# Patient Record
Sex: FEMALE | Race: WHITE | Employment: OTHER
[De-identification: names, ages, dates, MRNs, and addresses within clinical notes are randomized per-mention and may not be internally consistent; named-entity substitution may affect disease eponyms.]

---

## 2017-01-05 DIAGNOSIS — Z12.31 SCREENING MAMMOGRAM, ENCOUNTER FOR: ICD-10-CM

## 2017-02-21 ENCOUNTER — OFFICE VISIT (OUTPATIENT)
Dept: FAMILY MEDICINE CLINIC | Facility: CLINIC | Age: 67
End: 2017-02-21

## 2017-02-21 VITALS
DIASTOLIC BLOOD PRESSURE: 74 MMHG | SYSTOLIC BLOOD PRESSURE: 128 MMHG | WEIGHT: 128 LBS | HEART RATE: 71 BPM | TEMPERATURE: 98.2 F | OXYGEN SATURATION: 98 % | BODY MASS INDEX: 20.66 KG/M2

## 2017-02-21 DIAGNOSIS — I10 ESSENTIAL HYPERTENSION: Primary | ICD-10-CM

## 2017-02-21 DIAGNOSIS — Z23 NEED FOR PNEUMOCOCCAL VACCINATION: ICD-10-CM

## 2017-02-21 DIAGNOSIS — E78.00 HIGH CHOLESTEROL: ICD-10-CM

## 2017-02-21 PROCEDURE — G0009 ADMIN PNEUMOCOCCAL VACCINE: HCPCS | Performed by: FAMILY MEDICINE

## 2017-02-21 PROCEDURE — 99213 OFFICE O/P EST LOW 20 MIN: CPT | Performed by: FAMILY MEDICINE

## 2017-02-21 PROCEDURE — 90732 PPSV23 VACC 2 YRS+ SUBQ/IM: CPT | Performed by: FAMILY MEDICINE

## 2017-02-21 ASSESSMENT — ENCOUNTER SYMPTOMS
BLOOD IN STOOL: 0
SHORTNESS OF BREATH: 0
ABDOMINAL PAIN: 0

## 2017-02-22 ASSESSMENT — ENCOUNTER SYMPTOMS
TROUBLE SWALLOWING: 0
DIARRHEA: 0
COUGH: 0
WHEEZING: 0
VOMITING: 0
SORE THROAT: 0

## 2017-03-24 RX ORDER — NIFEDIPINE 60 MG/1
TABLET, EXTENDED RELEASE ORAL
Qty: 90 TABLET | Refills: 1 | Status: SHIPPED | OUTPATIENT
Start: 2017-03-24 | End: 2017-08-29 | Stop reason: SDUPTHER

## 2017-04-17 RX ORDER — POTASSIUM CHLORIDE 750 MG/1
TABLET, EXTENDED RELEASE ORAL
Qty: 180 TABLET | Refills: 1 | Status: SHIPPED | OUTPATIENT
Start: 2017-04-17 | End: 2017-09-26 | Stop reason: SDUPTHER

## 2017-04-17 RX ORDER — ETODOLAC 400 MG/1
TABLET, FILM COATED ORAL
Qty: 180 TABLET | Refills: 1 | Status: SHIPPED | OUTPATIENT
Start: 2017-04-17 | End: 2017-05-02

## 2017-05-01 RX ORDER — FAMOTIDINE 20 MG/1
TABLET, FILM COATED ORAL
Qty: 180 TABLET | Refills: 1 | Status: SHIPPED | OUTPATIENT
Start: 2017-05-01 | End: 2017-08-21 | Stop reason: SDUPTHER

## 2017-05-01 RX ORDER — MIRTAZAPINE 15 MG/1
TABLET, FILM COATED ORAL
Qty: 90 TABLET | Refills: 1 | Status: SHIPPED | OUTPATIENT
Start: 2017-05-01 | End: 2017-12-01 | Stop reason: SDUPTHER

## 2017-05-02 RX ORDER — ETODOLAC 400 MG/1
TABLET, FILM COATED ORAL
Qty: 180 TABLET | Refills: 1 | Status: SHIPPED | OUTPATIENT
Start: 2017-05-02 | End: 2018-01-23 | Stop reason: SDUPTHER

## 2017-06-23 ENCOUNTER — OFFICE VISIT (OUTPATIENT)
Dept: FAMILY MEDICINE CLINIC | Age: 67
End: 2017-06-23
Payer: COMMERCIAL

## 2017-06-23 VITALS
TEMPERATURE: 97.6 F | HEART RATE: 69 BPM | DIASTOLIC BLOOD PRESSURE: 77 MMHG | SYSTOLIC BLOOD PRESSURE: 154 MMHG | WEIGHT: 128.4 LBS | BODY MASS INDEX: 20.72 KG/M2 | OXYGEN SATURATION: 95 %

## 2017-06-23 DIAGNOSIS — I10 ESSENTIAL HYPERTENSION: Primary | ICD-10-CM

## 2017-06-23 DIAGNOSIS — J01.00 ACUTE MAXILLARY SINUSITIS, RECURRENCE NOT SPECIFIED: ICD-10-CM

## 2017-06-23 DIAGNOSIS — E78.00 HYPERCHOLESTEREMIA: ICD-10-CM

## 2017-06-23 PROCEDURE — 99214 OFFICE O/P EST MOD 30 MIN: CPT | Performed by: INTERNAL MEDICINE

## 2017-06-23 RX ORDER — FLUTICASONE PROPIONATE 50 MCG
1 SPRAY, SUSPENSION (ML) NASAL DAILY
Qty: 1 BOTTLE | Refills: 1 | Status: SHIPPED | OUTPATIENT
Start: 2017-06-23 | End: 2017-09-26 | Stop reason: ALTCHOICE

## 2017-06-23 RX ORDER — AMOXICILLIN AND CLAVULANATE POTASSIUM 875; 125 MG/1; MG/1
1 TABLET, FILM COATED ORAL 2 TIMES DAILY
Qty: 14 TABLET | Refills: 0 | Status: SHIPPED | OUTPATIENT
Start: 2017-06-23 | End: 2017-06-30

## 2017-06-23 ASSESSMENT — PATIENT HEALTH QUESTIONNAIRE - PHQ9
SUM OF ALL RESPONSES TO PHQ QUESTIONS 1-9: 0
2. FEELING DOWN, DEPRESSED OR HOPELESS: 0
1. LITTLE INTEREST OR PLEASURE IN DOING THINGS: 0
SUM OF ALL RESPONSES TO PHQ9 QUESTIONS 1 & 2: 0

## 2017-07-07 ENCOUNTER — NURSE ONLY (OUTPATIENT)
Dept: FAMILY MEDICINE CLINIC | Age: 67
End: 2017-07-07
Payer: COMMERCIAL

## 2017-07-07 VITALS — DIASTOLIC BLOOD PRESSURE: 68 MMHG | SYSTOLIC BLOOD PRESSURE: 122 MMHG

## 2017-07-07 DIAGNOSIS — I10 ESSENTIAL HYPERTENSION: Primary | ICD-10-CM

## 2017-07-07 PROCEDURE — 99212 OFFICE O/P EST SF 10 MIN: CPT | Performed by: INTERNAL MEDICINE

## 2017-08-22 RX ORDER — FAMOTIDINE 20 MG/1
TABLET, FILM COATED ORAL
Qty: 180 TABLET | Refills: 1 | Status: SHIPPED | OUTPATIENT
Start: 2017-08-22 | End: 2018-05-15 | Stop reason: SDUPTHER

## 2017-08-29 RX ORDER — NIFEDIPINE 60 MG/1
TABLET, EXTENDED RELEASE ORAL
Qty: 90 TABLET | Refills: 1 | Status: SHIPPED | OUTPATIENT
Start: 2017-08-29 | End: 2018-03-17 | Stop reason: SDUPTHER

## 2017-09-26 ENCOUNTER — OFFICE VISIT (OUTPATIENT)
Dept: FAMILY MEDICINE CLINIC | Age: 67
End: 2017-09-26
Payer: COMMERCIAL

## 2017-09-26 VITALS
SYSTOLIC BLOOD PRESSURE: 124 MMHG | DIASTOLIC BLOOD PRESSURE: 65 MMHG | HEART RATE: 70 BPM | TEMPERATURE: 97.5 F | OXYGEN SATURATION: 98 % | WEIGHT: 130.2 LBS | HEIGHT: 66 IN | BODY MASS INDEX: 20.93 KG/M2

## 2017-09-26 DIAGNOSIS — I10 ESSENTIAL HYPERTENSION: Primary | ICD-10-CM

## 2017-09-26 DIAGNOSIS — K21.9 GASTROESOPHAGEAL REFLUX DISEASE, ESOPHAGITIS PRESENCE NOT SPECIFIED: ICD-10-CM

## 2017-09-26 DIAGNOSIS — E78.00 HYPERCHOLESTEREMIA: ICD-10-CM

## 2017-09-26 DIAGNOSIS — Z23 NEED FOR INFLUENZA VACCINATION: ICD-10-CM

## 2017-09-26 DIAGNOSIS — Z00.00 HEALTH CARE MAINTENANCE: ICD-10-CM

## 2017-09-26 DIAGNOSIS — Z12.31 ENCOUNTER FOR SCREENING MAMMOGRAM FOR BREAST CANCER: ICD-10-CM

## 2017-09-26 PROCEDURE — 90662 IIV NO PRSV INCREASED AG IM: CPT | Performed by: INTERNAL MEDICINE

## 2017-09-26 PROCEDURE — 99213 OFFICE O/P EST LOW 20 MIN: CPT | Performed by: INTERNAL MEDICINE

## 2017-09-26 PROCEDURE — G0008 ADMIN INFLUENZA VIRUS VAC: HCPCS | Performed by: INTERNAL MEDICINE

## 2017-09-26 RX ORDER — POTASSIUM CHLORIDE 750 MG/1
10 TABLET, EXTENDED RELEASE ORAL 2 TIMES DAILY
Qty: 180 TABLET | Refills: 1 | Status: SHIPPED | OUTPATIENT
Start: 2017-09-26 | End: 2017-09-27 | Stop reason: SDUPTHER

## 2017-09-27 RX ORDER — POTASSIUM CHLORIDE 750 MG/1
10 TABLET, EXTENDED RELEASE ORAL 2 TIMES DAILY
Qty: 180 TABLET | Refills: 1 | Status: SHIPPED | OUTPATIENT
Start: 2017-09-27 | End: 2018-04-07 | Stop reason: SDUPTHER

## 2017-11-14 DIAGNOSIS — Z12.31 ENCOUNTER FOR SCREENING MAMMOGRAM FOR BREAST CANCER: ICD-10-CM

## 2017-11-21 RX ORDER — SIMVASTATIN 40 MG
TABLET ORAL
Qty: 90 TABLET | Refills: 1 | Status: SHIPPED | OUTPATIENT
Start: 2017-11-21 | End: 2018-05-15 | Stop reason: SDUPTHER

## 2017-11-21 RX ORDER — SIMVASTATIN 40 MG
TABLET ORAL
Qty: 90 TABLET | Refills: 1 | Status: SHIPPED | OUTPATIENT
Start: 2017-11-21 | End: 2017-11-21 | Stop reason: SDUPTHER

## 2017-11-21 NOTE — TELEPHONE ENCOUNTER
Last visit 9/26/17  Next Visit Date:  Future Appointments  Date Time Provider Simone Arangoi   12/28/2017 10:00 AM Iliana Pierson  [address] [facility]   Topic Date Due    DTaP/Tdap/Td vaccine (1 - Tdap) 07/21/1968    Breast cancer screen  11/09/2019    Lipid screen  11/18/2021    Colon cancer screen colonoscopy  11/06/2027    Zostavax vaccine  Completed    DEXA (modify frequency per FRAX score)  Completed    Flu vaccine  Completed    Pneumococcal low/med risk  Completed    Hepatitis C screen  Completed       No results found for: LABA1C          ( goal A1C is < 7)   No results found for: LABMICR  LDL Calculated (mg/dL)   Date Value   11/18/2016 84       (goal LDL is <100)   No results found for: AST, ALT, BUN  BP Readings from Last 3 Encounters:   09/26/17 124/65   07/07/17 122/68   06/23/17 (!) 154/77          (goal 120/80)    All Future Testing planned in [facility]  Lab Frequency Next Occurrence   Lipid Panel Once 11/26/2017   Comprehensive Metabolic Panel Once 86/52/2258   CBC With Auto Differential Once 11/26/2017   Urinalysis with Microscopic Once 11/26/2017               Patient Active Problem List:     Essential hypertension     Hypercholesteremia     GERD (gastroesophageal reflux disease)     DJD (degenerative joint disease)     Arthritis

## 2017-12-01 NOTE — TELEPHONE ENCOUNTER
Last visit 9/26/17  Next Visit Date:  Future Appointments  Date Time Provider Simone Arangoi   12/28/2017 10:00 AM Iliana Arechiga  [address] [facility]   Topic Date Due    DTaP/Tdap/Td vaccine (1 - Tdap) 07/21/1968    Breast cancer screen  11/09/2019    Lipid screen  11/18/2021    Colon cancer screen colonoscopy  11/06/2027    Zostavax vaccine  Completed    DEXA (modify frequency per FRAX score)  Completed    Flu vaccine  Completed    Pneumococcal low/med risk  Completed    Hepatitis C screen  Completed       No results found for: LABA1C          ( goal A1C is < 7)   No results found for: LABMICR  LDL Calculated (mg/dL)   Date Value   11/18/2016 84       (goal LDL is <100)   No results found for: AST, ALT, BUN  BP Readings from Last 3 Encounters:   09/26/17 124/65   07/07/17 122/68   06/23/17 (!) 154/77          (goal 120/80)    All Future Testing planned in [facility]  Lab Frequency Next Occurrence   Lipid Panel Once 11/26/2017   Comprehensive Metabolic Panel Once 11/11/2418   CBC With Auto Differential Once 11/26/2017   Urinalysis with Microscopic Once 11/26/2017               Patient Active Problem List:     Essential hypertension     Hypercholesteremia     GERD (gastroesophageal reflux disease)     DJD (degenerative joint disease)     Arthritis

## 2017-12-05 RX ORDER — MIRTAZAPINE 15 MG/1
TABLET, FILM COATED ORAL
Qty: 90 TABLET | Refills: 1 | Status: SHIPPED | OUTPATIENT
Start: 2017-12-05 | End: 2018-06-11 | Stop reason: SDUPTHER

## 2017-12-13 LAB
ALBUMIN SERPL-MCNC: 4.3 G/DL
ALP BLD-CCNC: 55 U/L
ALT SERPL-CCNC: 20 U/L
ANION GAP SERPL CALCULATED.3IONS-SCNC: 9 MMOL/L
AST SERPL-CCNC: 25 U/L
BASOPHILS ABSOLUTE: 0.1 /ΜL
BASOPHILS RELATIVE PERCENT: 0.9 %
BILIRUB SERPL-MCNC: 0.5 MG/DL (ref 0.1–1.4)
BUN BLDV-MCNC: 10 MG/DL
CALCIUM SERPL-MCNC: 9.5 MG/DL
CHLORIDE BLD-SCNC: 101 MMOL/L
CHOLESTEROL, TOTAL: 174 MG/DL
CHOLESTEROL/HDL RATIO: 3
CO2: 31 MMOL/L
CREAT SERPL-MCNC: 0.62 MG/DL
EOSINOPHILS ABSOLUTE: 0.4 /ΜL
EOSINOPHILS RELATIVE PERCENT: 6.8 %
GFR CALCULATED: >60
GLUCOSE BLD-MCNC: 103 MG/DL
HCT VFR BLD CALC: 41.4 % (ref 36–46)
HDLC SERPL-MCNC: 58 MG/DL (ref 35–70)
HEMOGLOBIN: 14.3 G/DL (ref 12–16)
LDL CHOLESTEROL CALCULATED: 89 MG/DL (ref 0–160)
LYMPHOCYTES ABSOLUTE: 1.7 /ΜL
LYMPHOCYTES RELATIVE PERCENT: 29.2 %
MCH RBC QN AUTO: 30.3 PG
MCHC RBC AUTO-ENTMCNC: 34.5 G/DL
MCV RBC AUTO: 88 FL
MONOCYTES ABSOLUTE: 0.5 /ΜL
MONOCYTES RELATIVE PERCENT: 7.9 %
NEUTROPHILS ABSOLUTE: 3.2 /ΜL
NEUTROPHILS RELATIVE PERCENT: 55.2 %
PDW BLD-RTO: 13.1 %
PLATELET # BLD: 274 K/ΜL
PMV BLD AUTO: 8.5 FL
POTASSIUM SERPL-SCNC: 4 MMOL/L
RBC # BLD: 4.71 10^6/ΜL
SODIUM BLD-SCNC: 141 MMOL/L
TOTAL PROTEIN: 7.3
TRIGL SERPL-MCNC: 135 MG/DL
VLDLC SERPL CALC-MCNC: 27 MG/DL
WBC # BLD: 5.8 10^3/ML

## 2018-01-03 ENCOUNTER — OFFICE VISIT (OUTPATIENT)
Dept: FAMILY MEDICINE CLINIC | Age: 68
End: 2018-01-03
Payer: COMMERCIAL

## 2018-01-03 VITALS
SYSTOLIC BLOOD PRESSURE: 130 MMHG | BODY MASS INDEX: 21.15 KG/M2 | TEMPERATURE: 97.4 F | RESPIRATION RATE: 16 BRPM | HEIGHT: 66 IN | OXYGEN SATURATION: 96 % | WEIGHT: 131.6 LBS | HEART RATE: 69 BPM | DIASTOLIC BLOOD PRESSURE: 80 MMHG

## 2018-01-03 DIAGNOSIS — I10 ESSENTIAL HYPERTENSION: Primary | ICD-10-CM

## 2018-01-03 DIAGNOSIS — K21.9 GASTROESOPHAGEAL REFLUX DISEASE, ESOPHAGITIS PRESENCE NOT SPECIFIED: ICD-10-CM

## 2018-01-03 DIAGNOSIS — G89.29 CHRONIC BILATERAL LOW BACK PAIN WITHOUT SCIATICA: ICD-10-CM

## 2018-01-03 DIAGNOSIS — E78.00 HYPERCHOLESTEREMIA: ICD-10-CM

## 2018-01-03 DIAGNOSIS — M54.50 CHRONIC BILATERAL LOW BACK PAIN WITHOUT SCIATICA: ICD-10-CM

## 2018-01-03 PROCEDURE — 99214 OFFICE O/P EST MOD 30 MIN: CPT | Performed by: INTERNAL MEDICINE

## 2018-01-03 NOTE — PROGRESS NOTES
Subjective:       Patient ID: Ursula Harris is a 75 y.o. female who presents for   Chief Complaint   Patient presents with   [place]   , and follow up of chronic medical problems. HPI:  Dyslipidemia  Patient presents for evaluation of lipids. Compliance with treatment thus far has been good. A repeat fasting lipid profile was not done. The patient does use medications that may worsen dyslipidemias (corticosteroids, progestins, anabolic steroids, diuretics, beta-blockers, amiodarone, cyclosporine, olanzapine). The patient exercises infrequently. The patient is not known to have coexisting coronary artery disease. Hypertension  Patient is here for follow-up of elevated blood pressure. She is not exercising and is adherent to a low-salt diet. Blood pressure is well controlled at home. Cardiac symptoms: none. Patient denies chest pain, chest pressure/discomfort, claudication, exertional chest pressure/discomfort, fatigue, irregular heart beat, near-syncope, orthopnea, palpitations and paroxysmal nocturnal dyspnea. Cardiovascular risk factors: dyslipidemia, hypertension and sedentary lifestyle. Use of agents associated with hypertension: none. History of target organ damage: none. L ear discomfort resolved     Patient's medications, allergies, past medical, surgical, social and family histories were reviewed and updated as appropriate. Social History   Substance Use Topics    Smoking status: Former Smoker     Packs/day: 1.50     Years: 15.00     Quit date: 9/15/1997    Smokeless tobacco: Never Used    Alcohol use 0.0 oz/week      Comment: social        Review of Systems  Energy level good overall, and weight is stable. No chest pain or shortness of breath.   Bowels have been normal without constipation or diarrhea       Objective:          /80 (Site: Right Arm, Position: Sitting, Cuff Size: Small Adult)   Pulse 69   Temp 97.4 °F (36.3 °C) (Oral)   Resp 16   Ht 5' 6.3\" (1.684 m)   Wt 131

## 2018-01-03 NOTE — PATIENT INSTRUCTIONS
[email or web address] and sign in to your [facility] account. Enter R646 in the [employer] box to learn more about \"Back Stretches: Exercises. \"     If you do not have an account, please click on the \"Sign Up Now\" link. Current as of: March 21, 2017  Content Version: 11.4  © 3316-4981 Healthwise, [employer]. Care instructions adapted under license by [facility] ([facility]). If you have questions about a medical condition or this instruction, always ask your healthcare professional. [address] any warranty or liability for your use of this information.

## 2018-01-08 RX ORDER — HYDROCHLOROTHIAZIDE 25 MG/1
TABLET ORAL
Qty: 90 TABLET | Refills: 1 | Status: SHIPPED | OUTPATIENT
Start: 2018-01-08 | End: 2018-06-30 | Stop reason: SDUPTHER

## 2018-01-12 DIAGNOSIS — Z00.00 HEALTH CARE MAINTENANCE: ICD-10-CM

## 2018-01-12 DIAGNOSIS — I10 ESSENTIAL HYPERTENSION: ICD-10-CM

## 2018-01-12 DIAGNOSIS — E78.00 HYPERCHOLESTEREMIA: ICD-10-CM

## 2018-01-23 RX ORDER — ETODOLAC 400 MG/1
TABLET, FILM COATED ORAL
Qty: 180 TABLET | Refills: 1 | Status: SHIPPED | OUTPATIENT
Start: 2018-01-23 | End: 2018-05-15 | Stop reason: ALTCHOICE

## 2018-03-19 NOTE — TELEPHONE ENCOUNTER
Last refill 12/03/2017  Last visit 01/03/2018    Next Visit Date:  Future Appointments  Date Time Provider Simone Arangoi   5/15/2018 9:00 AM Iliana Kee  [address] [facility]   Topic Date Due    Shingles Vaccine (1 of 2 - 2 Dose Series) 07/21/1999    DTaP/Tdap/Td vaccine (1 - Tdap) 01/03/2019 (Originally 7/21/1969)    Potassium monitoring  12/13/2018    Creatinine monitoring  12/13/2018    Breast cancer screen  11/09/2019    Lipid screen  12/13/2022    Colon cancer screen colonoscopy  11/06/2027    DEXA (modify frequency per FRAX score)  Completed    Flu vaccine  Completed    Pneumococcal low/med risk  Completed    Hepatitis C screen  Completed       No results found for: LABA1C          ( goal A1C is < 7)   No results found for: LABMICR  LDL Calculated (mg/dL)   Date Value   12/13/2017 89       (goal LDL is <100)   AST (U/L)   Date Value   12/13/2017 25     ALT (U/L)   Date Value   12/13/2017 20     BUN (mg/dL)   Date Value   12/13/2017 10     BP Readings from Last 3 Encounters:   01/03/18 130/80   09/26/17 124/65   07/07/17 122/68          (goal 120/80)    All Future Testing planned in [facility]              Patient Active Problem List:     Essential hypertension     Hypercholesteremia     GERD (gastroesophageal reflux disease)     DJD (degenerative joint disease)     Arthritis

## 2018-03-20 RX ORDER — NIFEDIPINE 60 MG/1
TABLET, EXTENDED RELEASE ORAL
Qty: 90 TABLET | Refills: 1 | Status: SHIPPED | OUTPATIENT
Start: 2018-03-20 | End: 2018-08-15 | Stop reason: SDUPTHER

## 2018-04-09 RX ORDER — POTASSIUM CHLORIDE 750 MG/1
TABLET, EXTENDED RELEASE ORAL
Qty: 180 TABLET | Refills: 1 | Status: SHIPPED | OUTPATIENT
Start: 2018-04-09 | End: 2018-10-01

## 2018-05-15 ENCOUNTER — OFFICE VISIT (OUTPATIENT)
Dept: FAMILY MEDICINE CLINIC | Age: 68
End: 2018-05-15
Payer: COMMERCIAL

## 2018-05-15 VITALS
TEMPERATURE: 97.1 F | SYSTOLIC BLOOD PRESSURE: 136 MMHG | BODY MASS INDEX: 20.95 KG/M2 | DIASTOLIC BLOOD PRESSURE: 70 MMHG | WEIGHT: 131 LBS | HEART RATE: 71 BPM | RESPIRATION RATE: 16 BRPM | OXYGEN SATURATION: 98 %

## 2018-05-15 DIAGNOSIS — B96.89 ACUTE BACTERIAL SINUSITIS: ICD-10-CM

## 2018-05-15 DIAGNOSIS — J01.90 ACUTE BACTERIAL SINUSITIS: ICD-10-CM

## 2018-05-15 DIAGNOSIS — M15.9 PRIMARY OSTEOARTHRITIS INVOLVING MULTIPLE JOINTS: ICD-10-CM

## 2018-05-15 DIAGNOSIS — I10 ESSENTIAL HYPERTENSION: Primary | ICD-10-CM

## 2018-05-15 DIAGNOSIS — E78.00 HYPERCHOLESTEREMIA: ICD-10-CM

## 2018-05-15 DIAGNOSIS — K21.9 GASTROESOPHAGEAL REFLUX DISEASE, ESOPHAGITIS PRESENCE NOT SPECIFIED: ICD-10-CM

## 2018-05-15 PROCEDURE — 99214 OFFICE O/P EST MOD 30 MIN: CPT | Performed by: INTERNAL MEDICINE

## 2018-05-15 RX ORDER — SIMVASTATIN 40 MG
TABLET ORAL
Qty: 90 TABLET | Refills: 1 | Status: SHIPPED | OUTPATIENT
Start: 2018-05-15 | End: 2018-10-05 | Stop reason: SDUPTHER

## 2018-05-15 RX ORDER — FAMOTIDINE 20 MG/1
TABLET, FILM COATED ORAL
Qty: 180 TABLET | Refills: 1 | Status: SHIPPED | OUTPATIENT
Start: 2018-05-15 | End: 2018-10-05 | Stop reason: SDUPTHER

## 2018-05-15 RX ORDER — DICLOFENAC SODIUM 75 MG/1
75 TABLET, DELAYED RELEASE ORAL 2 TIMES DAILY
Qty: 180 TABLET | Refills: 1 | Status: SHIPPED | OUTPATIENT
Start: 2018-05-15 | End: 2018-10-05 | Stop reason: SDUPTHER

## 2018-05-15 RX ORDER — AMOXICILLIN AND CLAVULANATE POTASSIUM 875; 125 MG/1; MG/1
1 TABLET, FILM COATED ORAL 2 TIMES DAILY
Qty: 14 TABLET | Refills: 0 | Status: SHIPPED | OUTPATIENT
Start: 2018-05-15 | End: 2018-05-22

## 2018-06-12 RX ORDER — MIRTAZAPINE 15 MG/1
TABLET, FILM COATED ORAL
Qty: 90 TABLET | Refills: 1 | Status: SHIPPED | OUTPATIENT
Start: 2018-06-12 | End: 2018-10-05 | Stop reason: SDUPTHER

## 2018-07-02 RX ORDER — HYDROCHLOROTHIAZIDE 25 MG/1
TABLET ORAL
Qty: 90 TABLET | Refills: 1 | Status: SHIPPED | OUTPATIENT
Start: 2018-07-02 | End: 2018-10-05 | Stop reason: SDUPTHER

## 2018-07-02 NOTE — TELEPHONE ENCOUNTER
Filled 1/8/18 #90 with 1 RF  Last seen 5/15/18    Next Visit Date:  Future Appointments  Date Time Provider Simone Arangoi   8/15/2018 9:00 AM Iliana Gardner  [address] [facility]   Topic Date Due    DTaP/Tdap/Td vaccine (1 - Tdap) 01/03/2019 (Originally 7/21/1969)    Shingles Vaccine (1 of 2 - 2 Dose Series) 05/15/2019 (Originally 11/1/2010)    Flu vaccine (1) 09/01/2018    Potassium monitoring  12/13/2018    Creatinine monitoring  12/13/2018    Breast cancer screen  11/09/2019    Lipid screen  12/13/2022    Colon cancer screen colonoscopy  11/06/2027    DEXA (modify frequency per FRAX score)  Completed    Pneumococcal low/med risk  Completed    Hepatitis C screen  Completed       No results found for: LABA1C          ( goal A1C is < 7)   No results found for: LABMICR  LDL Calculated (mg/dL)   Date Value   12/13/2017 89       (goal LDL is <100)   AST (U/L)   Date Value   12/13/2017 25     ALT (U/L)   Date Value   12/13/2017 20     BUN (mg/dL)   Date Value   12/13/2017 10     BP Readings from Last 3 Encounters:   05/15/18 136/70   01/03/18 130/80   09/26/17 124/65          (goal 120/80)    All Future Testing planned in [facility]              Patient Active Problem List:     Essential hypertension     Hypercholesteremia     GERD (gastroesophageal reflux disease)     DJD (degenerative joint disease)     Arthritis

## 2018-08-15 ENCOUNTER — OFFICE VISIT (OUTPATIENT)
Dept: FAMILY MEDICINE CLINIC | Age: 68
End: 2018-08-15
Payer: COMMERCIAL

## 2018-08-15 VITALS
DIASTOLIC BLOOD PRESSURE: 64 MMHG | OXYGEN SATURATION: 97 % | HEART RATE: 71 BPM | WEIGHT: 129.6 LBS | TEMPERATURE: 98 F | BODY MASS INDEX: 20.73 KG/M2 | SYSTOLIC BLOOD PRESSURE: 120 MMHG | RESPIRATION RATE: 16 BRPM

## 2018-08-15 DIAGNOSIS — K21.9 GASTROESOPHAGEAL REFLUX DISEASE, ESOPHAGITIS PRESENCE NOT SPECIFIED: ICD-10-CM

## 2018-08-15 DIAGNOSIS — I10 ESSENTIAL HYPERTENSION: Primary | ICD-10-CM

## 2018-08-15 DIAGNOSIS — M15.9 PRIMARY OSTEOARTHRITIS INVOLVING MULTIPLE JOINTS: ICD-10-CM

## 2018-08-15 DIAGNOSIS — E78.00 HYPERCHOLESTEREMIA: ICD-10-CM

## 2018-08-15 PROCEDURE — 99214 OFFICE O/P EST MOD 30 MIN: CPT | Performed by: INTERNAL MEDICINE

## 2018-08-15 RX ORDER — NIFEDIPINE 60 MG/1
TABLET, EXTENDED RELEASE ORAL
Qty: 90 TABLET | Refills: 1 | Status: SHIPPED | OUTPATIENT
Start: 2018-08-15 | End: 2018-10-05 | Stop reason: SDUPTHER

## 2018-08-15 ASSESSMENT — PATIENT HEALTH QUESTIONNAIRE - PHQ9
SUM OF ALL RESPONSES TO PHQ9 QUESTIONS 1 & 2: 0
1. LITTLE INTEREST OR PLEASURE IN DOING THINGS: 0
SUM OF ALL RESPONSES TO PHQ QUESTIONS 1-9: 0
SUM OF ALL RESPONSES TO PHQ QUESTIONS 1-9: 0
2. FEELING DOWN, DEPRESSED OR HOPELESS: 0

## 2018-08-15 NOTE — PROGRESS NOTES
Patient is present for HTN check up. Medications and pahrmacy reviewed. No concerns at this time. Visit Information    Have you changed or started any medications since your last visit including any over-the-counter medicines, vitamins, or herbal medicines? no   Have you stopped taking any of your medications? Is so, why? -  no  Are you having any side effects from any of your medications? - no    Have you seen any other physician or provider since your last visit?  no   Have you had any other diagnostic tests since your last visit?  no   Have you been seen in the emergency room and/or had an admission in a hospital since we last saw you?  no   Have you had your routine dental cleaning in the past 6 months?  no     Do you have an active MyChart account? If no, what is the barrier?   Yes    Patient Care Team:  Kathy Zimmer MD as PCP - General (Internal Medicine)    Medical History Review  Past Medical, Family, and Social History reviewed and does not contribute to the patient presenting condition    Health Maintenance   Topic Date Due    DTaP/Tdap/Td vaccine (1 - Tdap) 01/03/2019 (Originally 7/21/1969)    Shingles Vaccine (1 of 2 - 2 Dose Series) 05/15/2019 (Originally 11/1/2010)    Flu vaccine (1) 09/01/2018    Potassium monitoring  12/13/2018    Creatinine monitoring  12/13/2018    Breast cancer screen  11/09/2019    Lipid screen  12/13/2022    Colon cancer screen colonoscopy  11/06/2027    DEXA (modify frequency per FRAX score)  Completed    Pneumococcal low/med risk  Completed    Hepatitis C screen  Completed

## 2018-08-15 NOTE — PROGRESS NOTES
Subjective:       Patient ID: Zahraa Vee is a 70 y.o. female who presents for   Chief Complaint   Patient presents with    Hypertension   , and follow up of chronic medical problems. HPI:  Dyslipidemia  Patient presents for evaluation of lipids. Compliance with treatment thus far has been good. A repeat fasting lipid profile was not done. The patient does use medications that may worsen dyslipidemias (corticosteroids, progestins, anabolic steroids, diuretics, beta-blockers, amiodarone, cyclosporine, olanzapine). The patient exercises infrequently. The patient is not known to have coexisting coronary artery disease. Hypertension  Patient is here for follow-up of elevated blood pressure. She is not exercising and is adherent to a low-salt diet. Blood pressure is well controlled at home. Cardiac symptoms: none. Patient denies chest pain, chest pressure/discomfort, claudication, exertional chest pressure/discomfort, fatigue, irregular heart beat, near-syncope, orthopnea, palpitations and paroxysmal nocturnal dyspnea. Cardiovascular risk factors: dyslipidemia, hypertension and sedentary lifestyle. Use of agents associated with hypertension: none. History of target organ damage: none. Heartburn - well-controlled on pepcid BID. Back pain is getting worse, feels like it is going down into the lower parts of her back and into the hips. No numbness/tingling in legs, leg weakness. --> much better controlled on diclofenac last visit, not needing any other OTC meds to help her pain, is able to do her gardening now without too much trouble. Patient's medications, allergies, past medical, surgical, social and family histories were reviewed and updated as appropriate.     Social History   Substance Use Topics    Smoking status: Former Smoker     Packs/day: 1.50     Years: 15.00     Quit date: 9/15/1997    Smokeless tobacco: Never Used    Alcohol use 0.0 oz/week      Comment: social        Review of

## 2018-10-01 RX ORDER — POTASSIUM CHLORIDE 750 MG/1
TABLET, EXTENDED RELEASE ORAL
Qty: 180 TABLET | Refills: 1 | Status: SHIPPED | OUTPATIENT
Start: 2018-10-01 | End: 2018-10-05 | Stop reason: SDUPTHER

## 2018-10-05 DIAGNOSIS — K21.9 GASTROESOPHAGEAL REFLUX DISEASE, ESOPHAGITIS PRESENCE NOT SPECIFIED: ICD-10-CM

## 2018-10-05 DIAGNOSIS — I10 ESSENTIAL HYPERTENSION: ICD-10-CM

## 2018-10-05 DIAGNOSIS — M15.9 PRIMARY OSTEOARTHRITIS INVOLVING MULTIPLE JOINTS: ICD-10-CM

## 2018-10-05 DIAGNOSIS — E78.00 HYPERCHOLESTEREMIA: ICD-10-CM

## 2018-10-05 RX ORDER — DICLOFENAC SODIUM 75 MG/1
75 TABLET, DELAYED RELEASE ORAL 2 TIMES DAILY
Qty: 180 TABLET | Refills: 1 | Status: SHIPPED | OUTPATIENT
Start: 2018-10-05 | End: 2019-01-30 | Stop reason: SDUPTHER

## 2018-10-05 RX ORDER — POTASSIUM CHLORIDE 750 MG/1
TABLET, EXTENDED RELEASE ORAL
Qty: 28 TABLET | Refills: 0 | Status: SHIPPED | OUTPATIENT
Start: 2018-10-05 | End: 2019-06-19 | Stop reason: ALTCHOICE

## 2018-10-05 RX ORDER — HYDROCHLOROTHIAZIDE 25 MG/1
TABLET ORAL
Qty: 90 TABLET | Refills: 1 | Status: SHIPPED | OUTPATIENT
Start: 2018-10-05 | End: 2019-02-26 | Stop reason: SDUPTHER

## 2018-10-05 RX ORDER — NIFEDIPINE 60 MG/1
TABLET, EXTENDED RELEASE ORAL
Qty: 14 TABLET | Refills: 0 | Status: SHIPPED | OUTPATIENT
Start: 2018-10-05 | End: 2019-02-01 | Stop reason: SDUPTHER

## 2018-10-05 RX ORDER — MIRTAZAPINE 15 MG/1
TABLET, FILM COATED ORAL
Qty: 14 TABLET | Refills: 0 | Status: SHIPPED | OUTPATIENT
Start: 2018-10-05 | End: 2018-11-13 | Stop reason: SDUPTHER

## 2018-10-05 RX ORDER — SIMVASTATIN 40 MG
TABLET ORAL
Qty: 14 TABLET | Refills: 0 | Status: SHIPPED | OUTPATIENT
Start: 2018-10-05 | End: 2018-12-07 | Stop reason: SDUPTHER

## 2018-10-05 RX ORDER — FAMOTIDINE 20 MG/1
TABLET, FILM COATED ORAL
Qty: 14 TABLET | Refills: 0 | Status: SHIPPED | OUTPATIENT
Start: 2018-10-05 | End: 2018-11-13 | Stop reason: SDUPTHER

## 2018-10-05 NOTE — TELEPHONE ENCOUNTER
Please, refill all her medication for 7 days! The pt is currently in [place]. Left her meds at home, has not had them for 1 day.   Ph.    Fax [phone]

## 2018-10-17 ENCOUNTER — NURSE ONLY (OUTPATIENT)
Dept: FAMILY MEDICINE CLINIC | Age: 68
End: 2018-10-17
Payer: COMMERCIAL

## 2018-10-17 DIAGNOSIS — Z23 NEED FOR INFLUENZA VACCINATION: Primary | ICD-10-CM

## 2018-10-22 PROCEDURE — 90662 IIV NO PRSV INCREASED AG IM: CPT | Performed by: INTERNAL MEDICINE

## 2018-10-22 PROCEDURE — G0008 ADMIN INFLUENZA VIRUS VAC: HCPCS | Performed by: INTERNAL MEDICINE

## 2018-11-13 DIAGNOSIS — K21.9 GASTROESOPHAGEAL REFLUX DISEASE, ESOPHAGITIS PRESENCE NOT SPECIFIED: ICD-10-CM

## 2018-11-13 RX ORDER — MIRTAZAPINE 15 MG/1
TABLET, FILM COATED ORAL
Qty: 90 TABLET | Refills: 1 | Status: SHIPPED | OUTPATIENT
Start: 2018-11-13 | End: 2019-06-09 | Stop reason: SDUPTHER

## 2018-11-13 RX ORDER — FAMOTIDINE 20 MG/1
TABLET, FILM COATED ORAL
Qty: 180 TABLET | Refills: 1 | Status: SHIPPED | OUTPATIENT
Start: 2018-11-13 | End: 2019-02-01 | Stop reason: SDUPTHER

## 2018-11-13 NOTE — TELEPHONE ENCOUNTER
Last visit  8/15/18  Next Visit Date:  Future Appointments  Date Time Provider Simone Arangoi   12/14/2018 9:30 AM Iliana Hastings  [address] [facility]   Topic Date Due    DTaP/Tdap/Td vaccine (1 - Tdap) 01/03/2019 (Originally 7/21/1969)    Shingles Vaccine (1 of 2 - 2 Dose Series) 05/15/2019 (Originally 11/1/2010)    Potassium monitoring  12/13/2018    Creatinine monitoring  12/13/2018    Breast cancer screen  11/09/2019    Lipid screen  12/13/2022    Colon cancer screen colonoscopy  11/06/2027    DEXA (modify frequency per FRAX score)  Completed    Flu vaccine  Completed    Pneumococcal low/med risk  Completed    Hepatitis C screen  Completed       No results found for: LABA1C          ( goal A1C is < 7)   No results found for: LABMICR  LDL Calculated (mg/dL)   Date Value   12/13/2017 89   11/18/2016 84       (goal LDL is <100)   AST (U/L)   Date Value   12/13/2017 25     ALT (U/L)   Date Value   12/13/2017 20     BUN (mg/dL)   Date Value   12/13/2017 10     BP Readings from Last 3 Encounters:   08/15/18 120/64   05/15/18 136/70   01/03/18 130/80          (goal 120/80)    All Future Testing planned in [facility]              Patient Active Problem List:     Essential hypertension     Hypercholesteremia     GERD (gastroesophageal reflux disease)     DJD (degenerative joint disease)     Arthritis

## 2018-12-07 DIAGNOSIS — E78.00 HYPERCHOLESTEREMIA: ICD-10-CM

## 2018-12-07 RX ORDER — SIMVASTATIN 40 MG
TABLET ORAL
Qty: 90 TABLET | Refills: 1 | Status: SHIPPED | OUTPATIENT
Start: 2018-12-07 | End: 2019-06-09 | Stop reason: SDUPTHER

## 2018-12-14 ENCOUNTER — OFFICE VISIT (OUTPATIENT)
Dept: FAMILY MEDICINE CLINIC | Age: 68
End: 2018-12-14
Payer: COMMERCIAL

## 2018-12-14 VITALS
DIASTOLIC BLOOD PRESSURE: 70 MMHG | WEIGHT: 127.2 LBS | BODY MASS INDEX: 20.35 KG/M2 | HEART RATE: 72 BPM | TEMPERATURE: 96.7 F | OXYGEN SATURATION: 99 % | SYSTOLIC BLOOD PRESSURE: 124 MMHG

## 2018-12-14 DIAGNOSIS — Z13.29 SCREENING FOR THYROID DISORDER: ICD-10-CM

## 2018-12-14 DIAGNOSIS — I10 ESSENTIAL HYPERTENSION: Primary | ICD-10-CM

## 2018-12-14 DIAGNOSIS — Z13.0 SCREENING FOR DEFICIENCY ANEMIA: ICD-10-CM

## 2018-12-14 DIAGNOSIS — E78.00 HYPERCHOLESTEREMIA: ICD-10-CM

## 2018-12-14 DIAGNOSIS — G47.9 SLEEPING DIFFICULTY: ICD-10-CM

## 2018-12-14 DIAGNOSIS — Z12.31 ENCOUNTER FOR SCREENING MAMMOGRAM FOR BREAST CANCER: ICD-10-CM

## 2018-12-14 PROCEDURE — 99214 OFFICE O/P EST MOD 30 MIN: CPT | Performed by: INTERNAL MEDICINE

## 2018-12-14 NOTE — PROGRESS NOTES
Patient is present for 4 month f/u for HTN  No concerns at this time    Patient is due for labs    Pharmacy and medication reviewed with patient    Visit Information    Have you changed or started any medications since your last visit including any over-the-counter medicines, vitamins, or herbal medicines? no   Have you stopped taking any of your medications? Is so, why? -  no  Are you having any side effects from any of your medications? - no    Have you seen any other physician or provider since your last visit?  no   Have you had any other diagnostic tests since your last visit?  no   Have you been seen in the emergency room and/or had an admission in a hospital since we last saw you?  no   Have you had your routine dental cleaning in the past 6 months?  no     Do you have an active MyChart account? If no, what is the barrier?   Yes    Patient Care Team:  Royer Marvin MD as PCP - General (Internal Medicine)    Medical History Review  Past Medical, Family, and Social History reviewed and does contribute to the patient presenting condition    Health Maintenance   Topic Date Due    Potassium monitoring  12/13/2018    Creatinine monitoring  12/13/2018    DTaP/Tdap/Td vaccine (1 - Tdap) 01/03/2019 (Originally 7/21/1969)    Shingles Vaccine (1 of 2 - 2 Dose Series) 05/15/2019 (Originally 11/1/2010)    Breast cancer screen  11/09/2019    Lipid screen  12/13/2022    Colon cancer screen colonoscopy  11/06/2027    DEXA (modify frequency per FRAX score)  Completed    Flu vaccine  Completed    Pneumococcal low/med risk  Completed    Hepatitis C screen  Completed
tobacco: Never Used    Alcohol use 0.0 oz/week      Comment: social        Review of Systems  Energy level good overall, and weight is stable. No chest pain or shortness of breath. Bowels have been normal without constipation or diarrhea       Objective:          /70 (Site: Left Upper Arm, Position: Sitting, Cuff Size: Small Adult)   Pulse 72   Temp 96.7 °F (35.9 °C) (Oral)   Wt 127 lb 3.2 oz (57.7 kg)   SpO2 99%   BMI 20.35 kg/m²     General: Alert and oriented, in no distress. Patient ambulating with normal gait. Normal body habitus  Chest: clear with no wheezes or rales. No retractions, or use of accessory muscles noted. Cardiovascular: PMI is not displaced, and no thrill noted. Regular rate and rhythm with no rub, murmur or gallop. There is no peripheral edema. Pedal pulses are normal.   Abdomen: Abdomen is soft and nontender. There is no organomegaly based on exam by palpation. There is no abdominal obesity present. The bowel sounds are normal.   Musculoskeletal: There are no deformities of the the extremities. Patient has all ten fingers intact. The patient has full range of motion on all 4 extremities without pain. Skin: The skin is warm and dry. There are no rashes noted. Data Review    Prior to Visit Medications    Medication Sig Taking?  Authorizing Provider   simvastatin (ZOCOR) 40 MG tablet TAKE 1 TABLET NIGHTLY Yes Iliana Mars MD   famotidine (PEPCID) 20 MG tablet TAKE 1 TABLET TWICE A DAY Yes Iliana Mars MD   mirtazapine (REMERON) 15 MG tablet TAKE 1 TABLET NIGHTLY Yes Iliana Mars MD   diclofenac (VOLTAREN) 75 MG EC tablet Take 1 tablet by mouth 2 times daily Yes Mary Salas MD   hydrochlorothiazide (HYDRODIURIL) 25 MG tablet TAKE 1 TABLET DAILY Yes Iliana Mars MD   potassium chloride (KLOR-CON M10) 10 MEQ extended release tablet TAKE 1 TABLET TWICE A DAY Yes Iliana Mars MD   NIFEdipine (PROCARDIA XL) 60 MG extended release tablet TAKE 1

## 2018-12-17 ENCOUNTER — TELEPHONE (OUTPATIENT)
Dept: FAMILY MEDICINE CLINIC | Age: 68
End: 2018-12-17

## 2018-12-17 DIAGNOSIS — Z12.11 SCREENING FOR COLORECTAL CANCER: Primary | ICD-10-CM

## 2018-12-17 DIAGNOSIS — Z12.12 SCREENING FOR COLORECTAL CANCER: Primary | ICD-10-CM

## 2018-12-17 DIAGNOSIS — Z12.11 SCREENING FOR COLORECTAL CANCER: ICD-10-CM

## 2018-12-17 DIAGNOSIS — Z12.12 SCREENING FOR COLORECTAL CANCER: ICD-10-CM

## 2018-12-17 LAB
ALBUMIN SERPL-MCNC: 4.2 G/DL
ALP BLD-CCNC: 59 U/L
ALT SERPL-CCNC: 27 U/L
ANION GAP SERPL CALCULATED.3IONS-SCNC: NORMAL MMOL/L
AST SERPL-CCNC: 29 U/L
BASOPHILS ABSOLUTE: 0.1 /ΜL
BASOPHILS RELATIVE PERCENT: 0.7 %
BILIRUB SERPL-MCNC: 0.6 MG/DL (ref 0.1–1.4)
BUN BLDV-MCNC: 12 MG/DL
CALCIUM SERPL-MCNC: 9.8 MG/DL
CHLORIDE BLD-SCNC: 100 MMOL/L
CHOLESTEROL, TOTAL: 156 MG/DL
CHOLESTEROL/HDL RATIO: 3.1
CO2: 29 MMOL/L
CONTROL: PRESENT
CREAT SERPL-MCNC: 0.6 MG/DL
EOSINOPHILS ABSOLUTE: 0.4 /ΜL
EOSINOPHILS RELATIVE PERCENT: 5.1 %
GFR CALCULATED: >60
GLUCOSE BLD-MCNC: 106 MG/DL
HCT VFR BLD CALC: 41.4 % (ref 36–46)
HDLC SERPL-MCNC: 50 MG/DL (ref 35–70)
HEMOCCULT STL QL: NEGATIVE
HEMOGLOBIN: 13.9 G/DL (ref 12–16)
LDL CHOLESTEROL CALCULATED: 65 MG/DL (ref 0–160)
LYMPHOCYTES ABSOLUTE: 1.9 /ΜL
LYMPHOCYTES RELATIVE PERCENT: 27.3 %
MCH RBC QN AUTO: 29.1 PG
MCHC RBC AUTO-ENTMCNC: 33.6 G/DL
MCV RBC AUTO: 86.6 FL
MONOCYTES ABSOLUTE: 0.6 /ΜL
MONOCYTES RELATIVE PERCENT: 8.4 %
NEUTROPHILS ABSOLUTE: 4.2 /ΜL
NEUTROPHILS RELATIVE PERCENT: 58.4 %
PDW BLD-RTO: 13.2 %
PLATELET # BLD: 306 K/ΜL
PMV BLD AUTO: NORMAL FL
POTASSIUM SERPL-SCNC: 3.7 MMOL/L
RBC # BLD: 4.78 10^6/ΜL
SODIUM BLD-SCNC: 139 MMOL/L
TOTAL PROTEIN: 7.6
TRIGL SERPL-MCNC: 203 MG/DL
TSH SERPL DL<=0.05 MIU/L-ACNC: 2.13 UIU/ML
VLDLC SERPL CALC-MCNC: 41 MG/DL
WBC # BLD: 7.11 10^3/ML

## 2018-12-17 PROCEDURE — 82274 ASSAY TEST FOR BLOOD FECAL: CPT | Performed by: INTERNAL MEDICINE

## 2018-12-26 DIAGNOSIS — Z13.0 SCREENING FOR DEFICIENCY ANEMIA: ICD-10-CM

## 2018-12-26 DIAGNOSIS — E78.00 HYPERCHOLESTEREMIA: ICD-10-CM

## 2018-12-26 DIAGNOSIS — Z13.29 SCREENING FOR THYROID DISORDER: ICD-10-CM

## 2018-12-26 DIAGNOSIS — I10 ESSENTIAL HYPERTENSION: ICD-10-CM

## 2019-01-20 DIAGNOSIS — M15.9 PRIMARY OSTEOARTHRITIS INVOLVING MULTIPLE JOINTS: ICD-10-CM

## 2019-01-21 RX ORDER — DICLOFENAC SODIUM 75 MG/1
TABLET, DELAYED RELEASE ORAL
Qty: 180 TABLET | Refills: 1 | OUTPATIENT
Start: 2019-01-21

## 2019-01-30 DIAGNOSIS — M15.9 PRIMARY OSTEOARTHRITIS INVOLVING MULTIPLE JOINTS: ICD-10-CM

## 2019-02-01 DIAGNOSIS — K21.9 GASTROESOPHAGEAL REFLUX DISEASE, ESOPHAGITIS PRESENCE NOT SPECIFIED: ICD-10-CM

## 2019-02-01 DIAGNOSIS — I10 ESSENTIAL HYPERTENSION: ICD-10-CM

## 2019-02-01 RX ORDER — FAMOTIDINE 20 MG/1
TABLET, FILM COATED ORAL
Qty: 180 TABLET | Refills: 1 | Status: SHIPPED | OUTPATIENT
Start: 2019-02-01 | End: 2019-08-16 | Stop reason: SDUPTHER

## 2019-02-01 RX ORDER — NIFEDIPINE 60 MG/1
TABLET, EXTENDED RELEASE ORAL
Qty: 90 TABLET | Refills: 1 | Status: SHIPPED | OUTPATIENT
Start: 2019-02-01 | End: 2019-09-19 | Stop reason: SDUPTHER

## 2019-02-04 RX ORDER — DICLOFENAC SODIUM 75 MG/1
75 TABLET, DELAYED RELEASE ORAL 2 TIMES DAILY
Qty: 180 TABLET | Refills: 1 | Status: SHIPPED | OUTPATIENT
Start: 2019-02-04 | End: 2019-07-18 | Stop reason: SDUPTHER

## 2019-02-26 RX ORDER — HYDROCHLOROTHIAZIDE 25 MG/1
TABLET ORAL
Qty: 90 TABLET | Refills: 1 | Status: SHIPPED | OUTPATIENT
Start: 2019-02-26 | End: 2019-08-30 | Stop reason: SDUPTHER

## 2019-04-22 RX ORDER — POTASSIUM CHLORIDE 750 MG/1
TABLET, EXTENDED RELEASE ORAL
Qty: 180 TABLET | Refills: 1 | Status: SHIPPED | OUTPATIENT
Start: 2019-04-22 | End: 2019-10-12 | Stop reason: SDUPTHER

## 2019-06-09 DIAGNOSIS — E78.00 HYPERCHOLESTEREMIA: ICD-10-CM

## 2019-06-09 DIAGNOSIS — G47.9 SLEEPING DIFFICULTY: Primary | ICD-10-CM

## 2019-06-10 RX ORDER — SIMVASTATIN 40 MG
TABLET ORAL
Qty: 90 TABLET | Refills: 0 | Status: SHIPPED | OUTPATIENT
Start: 2019-06-10 | End: 2019-08-30 | Stop reason: SDUPTHER

## 2019-06-10 RX ORDER — MIRTAZAPINE 15 MG/1
TABLET, FILM COATED ORAL
Qty: 90 TABLET | Refills: 0 | Status: SHIPPED | OUTPATIENT
Start: 2019-06-10 | End: 2019-08-16 | Stop reason: SDUPTHER

## 2019-06-10 NOTE — TELEPHONE ENCOUNTER
Last visit: 12/14/18  Last Med refill: 12/7/18 and 11/13/18   Does patient have enough medication for 72 hours: Yes    Next Visit Date:  Future Appointments   Date Time Provider Simone Zarco   6/19/2019 10:45 AM Iliana Harden  [address] [facility]   Topic Date Due    DTaP/Tdap/Td vaccine (1 - Tdap) 07/21/1968    Shingles Vaccine (2 of 3) 10/27/2010    Potassium monitoring  12/17/2019    Creatinine monitoring  12/17/2019    Breast cancer screen  03/07/2021    Lipid screen  12/17/2023    Colon cancer screen colonoscopy  11/06/2027    DEXA (modify frequency per FRAX score)  Completed    Flu vaccine  Completed    Pneumococcal 65+ years Vaccine  Completed    Hepatitis C screen  Completed       No results found for: LABA1C          ( goal A1C is < 7)   No results found for: LABMICR  LDL Calculated (mg/dL)   Date Value   12/17/2018 65   12/13/2017 89       (goal LDL is <100)   AST (U/L)   Date Value   12/17/2018 29     ALT (U/L)   Date Value   12/17/2018 27     BUN (mg/dL)   Date Value   12/17/2018 12     BP Readings from Last 3 Encounters:   12/14/18 124/70   08/15/18 120/64   05/15/18 136/70          (goal 120/80)    All Future Testing planned in [facility]              Patient Active Problem List:     Essential hypertension     Hypercholesteremia     GERD (gastroesophageal reflux disease)     DJD (degenerative joint disease)     Arthritis     Sleeping difficulty

## 2019-06-19 ENCOUNTER — OFFICE VISIT (OUTPATIENT)
Dept: FAMILY MEDICINE CLINIC | Age: 69
End: 2019-06-19
Payer: COMMERCIAL

## 2019-06-19 VITALS
HEIGHT: 68 IN | RESPIRATION RATE: 16 BRPM | BODY MASS INDEX: 18.85 KG/M2 | WEIGHT: 124.4 LBS | OXYGEN SATURATION: 98 % | TEMPERATURE: 97.4 F | DIASTOLIC BLOOD PRESSURE: 78 MMHG | SYSTOLIC BLOOD PRESSURE: 106 MMHG | HEART RATE: 75 BPM

## 2019-06-19 DIAGNOSIS — E78.00 HYPERCHOLESTEREMIA: ICD-10-CM

## 2019-06-19 DIAGNOSIS — G47.9 SLEEPING DIFFICULTY: ICD-10-CM

## 2019-06-19 DIAGNOSIS — M15.9 PRIMARY OSTEOARTHRITIS INVOLVING MULTIPLE JOINTS: ICD-10-CM

## 2019-06-19 DIAGNOSIS — I10 ESSENTIAL HYPERTENSION: Primary | ICD-10-CM

## 2019-06-19 PROCEDURE — 99214 OFFICE O/P EST MOD 30 MIN: CPT | Performed by: INTERNAL MEDICINE

## 2019-06-19 ASSESSMENT — PATIENT HEALTH QUESTIONNAIRE - PHQ9
SUM OF ALL RESPONSES TO PHQ QUESTIONS 1-9: 0
SUM OF ALL RESPONSES TO PHQ9 QUESTIONS 1 & 2: 0
SUM OF ALL RESPONSES TO PHQ QUESTIONS 1-9: 0
2. FEELING DOWN, DEPRESSED OR HOPELESS: 0
1. LITTLE INTEREST OR PLEASURE IN DOING THINGS: 0

## 2019-06-19 NOTE — PROGRESS NOTES
Subjective:       Patient ID: Willian Sexton is a 70 y.o. female who presents for   Chief Complaint   Patient presents with    Hypertension   , and follow up of chronic medical problems. HPI:  Dyslipidemia  Patient presents for evaluation of lipids. Compliance with treatment thus far has been good. A repeat fasting lipid profile was not done. The patient does use medications that may worsen dyslipidemias (corticosteroids, progestins, anabolic steroids, diuretics, beta-blockers, amiodarone, cyclosporine, olanzapine). The patient exercises infrequently. The patient is not known to have coexisting coronary artery disease. Hypertension  Patient is here for follow-up of elevated blood pressure. She is not exercising and is adherent to a low-salt diet. Blood pressure is well controlled at home. Cardiac symptoms: none. Patient denies chest pain, chest pressure/discomfort, claudication, exertional chest pressure/discomfort, fatigue, irregular heart beat, near-syncope, orthopnea, palpitations and paroxysmal nocturnal dyspnea. Cardiovascular risk factors: dyslipidemia, hypertension and sedentary lifestyle. Use of agents associated with hypertension: none. History of target organ damage: none. Heartburn - well-controlled on pepcid BID. Back pain is getting worse, feels like it is going down into the lower parts of her back and into the hips. No numbness/tingling in legs, leg weakness. --> much better controlled on diclofenac last visit, not needing any other OTC meds to help her pain, is able to do her gardening now without too much trouble. --> continues to do well on diclofenac even now that the weather is cold. --> nothing helps when she is doing yardwork,     Patient's medications, allergies, past medical, surgical, social and family histories were reviewed and updated as appropriate.     Social History     Tobacco Use    Smoking status: Former Smoker     Packs/day: 1.50     Years: 15.00     Pack years: Mary Lou Gomez APRN - CNP   famotidine (PEPCID) 20 MG tablet TAKE 1 TABLET TWICE A DAY Yes Iliana Ndiaye MD   NIFEdipine (PROCARDIA XL) 60 MG extended release tablet TAKE 1 TABLET DAILY Yes Iliana Ndiaye MD   potassium chloride (KLOR-CON M10) 10 MEQ extended release tablet TAKE 1 TABLET TWICE A DAY Yes Jesus Jacques MD        Mammogram 3/2019 - wnl, repeat 1 year   Labs reviewed, FIT test done 12/2018 - negative for blood. Assessment/Plan:       1. Essential hypertension  Continue current regimen     2. Hypercholesteremia  Continue simvastatin     3. Primary osteoarthritis involving multiple joints  Continue voltaren     4.  Sleeping difficulty  Continue remeron QHS           Electronically signed by Jade Salgado MD on 6/19/2019 at 11:12 AM

## 2019-07-18 DIAGNOSIS — M15.9 PRIMARY OSTEOARTHRITIS INVOLVING MULTIPLE JOINTS: ICD-10-CM

## 2019-07-18 RX ORDER — DICLOFENAC SODIUM 75 MG/1
TABLET, DELAYED RELEASE ORAL
Qty: 180 TABLET | Refills: 1 | Status: SHIPPED | OUTPATIENT
Start: 2019-07-18 | End: 2020-01-27

## 2019-07-18 NOTE — TELEPHONE ENCOUNTER
Last visit: 6/19/19  Last Med refill: 2/4/19  Does patient have enough medication for 72 hours: Yes    Next Visit Date:  Future Appointments   Date Time Provider Simone Zarco   12/19/2019  9:30 AM Iliana Paulson  [address] Maintenance   Topic Date Due    Annual Wellness Visit (AWV)  07/21/2012    DTaP/Tdap/Td vaccine (1 - Tdap) 06/19/2020 (Originally 7/21/1969)    Shingles Vaccine (2 of 3) 06/19/2020 (Originally 10/27/2010)    Flu vaccine (1) 09/01/2019    Potassium monitoring  12/17/2019    Creatinine monitoring  12/17/2019    Breast cancer screen  03/07/2021    Lipid screen  12/17/2023    Colon cancer screen colonoscopy  11/06/2027    DEXA (modify frequency per FRAX score)  Completed    Pneumococcal 65+ years Vaccine  Completed    Hepatitis C screen  Completed       No results found for: LABA1C          ( goal A1C is < 7)   No results found for: LABMICR  LDL Calculated (mg/dL)   Date Value   12/17/2018 65   12/13/2017 89       (goal LDL is <100)   AST (U/L)   Date Value   12/17/2018 29     ALT (U/L)   Date Value   12/17/2018 27     BUN (mg/dL)   Date Value   12/17/2018 12     BP Readings from Last 3 Encounters:   06/19/19 106/78   12/14/18 124/70   08/15/18 120/64          (goal 120/80)    All Future Testing planned in [facility]              Patient Active Problem List:     Essential hypertension     Hypercholesteremia     GERD (gastroesophageal reflux disease)     DJD (degenerative joint disease)     Arthritis     Sleeping difficulty

## 2019-08-16 DIAGNOSIS — G47.9 SLEEPING DIFFICULTY: ICD-10-CM

## 2019-08-16 DIAGNOSIS — K21.9 GASTROESOPHAGEAL REFLUX DISEASE, ESOPHAGITIS PRESENCE NOT SPECIFIED: ICD-10-CM

## 2019-08-16 RX ORDER — MIRTAZAPINE 15 MG/1
TABLET, FILM COATED ORAL
Qty: 90 TABLET | Refills: 1 | Status: SHIPPED | OUTPATIENT
Start: 2019-08-16 | End: 2020-01-27

## 2019-08-16 RX ORDER — FAMOTIDINE 20 MG/1
TABLET, FILM COATED ORAL
Qty: 180 TABLET | Refills: 1 | Status: SHIPPED | OUTPATIENT
Start: 2019-08-16 | End: 2020-02-20 | Stop reason: SDUPTHER

## 2019-08-30 DIAGNOSIS — E78.00 HYPERCHOLESTEREMIA: ICD-10-CM

## 2019-08-30 RX ORDER — HYDROCHLOROTHIAZIDE 25 MG/1
TABLET ORAL
Qty: 90 TABLET | Refills: 1 | Status: SHIPPED | OUTPATIENT
Start: 2019-08-30 | End: 2020-04-08

## 2019-08-30 RX ORDER — SIMVASTATIN 40 MG
TABLET ORAL
Qty: 90 TABLET | Refills: 1 | Status: SHIPPED | OUTPATIENT
Start: 2019-08-30 | End: 2020-03-09

## 2019-09-19 DIAGNOSIS — I10 ESSENTIAL HYPERTENSION: ICD-10-CM

## 2019-09-19 RX ORDER — NIFEDIPINE 60 MG/1
TABLET, EXTENDED RELEASE ORAL
Qty: 90 TABLET | Refills: 1 | Status: SHIPPED | OUTPATIENT
Start: 2019-09-19 | End: 2020-03-09

## 2019-09-19 NOTE — TELEPHONE ENCOUNTER
Please address the medication refill and close the encounter. If I can be of assistance, please route to the applicable pool. Thank you.     Last visit: 6/19/19  Last Med refill: 6/1/19  Does patient have enough medication for 72 hours: Yes    Next Visit Date:  Future Appointments   Date Time Provider Simone Zarco   12/19/2019  9:30 AM Iliana Bennett  [address] Maintenance   Topic Date Due    Annual Wellness Visit (AWV)  06/19/2019    Flu vaccine (1) 09/01/2019    DTaP/Tdap/Td vaccine (1 - Tdap) 06/19/2020 (Originally 7/21/1969)    Shingles Vaccine (2 of 3) 06/19/2020 (Originally 10/27/2010)    Potassium monitoring  12/17/2019    Creatinine monitoring  12/17/2019    Breast cancer screen  03/07/2021    Lipid screen  12/17/2023    Colon cancer screen colonoscopy  11/06/2027    DEXA (modify frequency per FRAX score)  Completed    Pneumococcal 65+ years Vaccine  Completed    Hepatitis C screen  Completed       No results found for: LABA1C          ( goal A1C is < 7)   No results found for: LABMICR  LDL Calculated (mg/dL)   Date Value   12/17/2018 65   12/13/2017 89       (goal LDL is <100)   AST (U/L)   Date Value   12/17/2018 29     ALT (U/L)   Date Value   12/17/2018 27     BUN (mg/dL)   Date Value   12/17/2018 12     BP Readings from Last 3 Encounters:   06/19/19 106/78   12/14/18 124/70   08/15/18 120/64          (goal 120/80)    All Future Testing planned in [facility]              Patient Active Problem List:     Essential hypertension     Hypercholesteremia     GERD (gastroesophageal reflux disease)     DJD (degenerative joint disease)     Arthritis     Sleeping difficulty

## 2019-10-14 RX ORDER — POTASSIUM CHLORIDE 750 MG/1
TABLET, EXTENDED RELEASE ORAL
Qty: 180 TABLET | Refills: 1 | Status: SHIPPED | OUTPATIENT
Start: 2019-10-14 | End: 2020-04-02

## 2019-12-19 ENCOUNTER — OFFICE VISIT (OUTPATIENT)
Dept: FAMILY MEDICINE CLINIC | Age: 69
End: 2019-12-19
Payer: COMMERCIAL

## 2019-12-19 VITALS
DIASTOLIC BLOOD PRESSURE: 80 MMHG | WEIGHT: 122 LBS | SYSTOLIC BLOOD PRESSURE: 115 MMHG | BODY MASS INDEX: 18.49 KG/M2 | HEIGHT: 68 IN | HEART RATE: 71 BPM | TEMPERATURE: 96.9 F | OXYGEN SATURATION: 98 %

## 2019-12-19 DIAGNOSIS — J01.90 ACUTE BACTERIAL SINUSITIS: ICD-10-CM

## 2019-12-19 DIAGNOSIS — Z00.00 ROUTINE GENERAL MEDICAL EXAMINATION AT A HEALTH CARE FACILITY: Primary | ICD-10-CM

## 2019-12-19 DIAGNOSIS — E78.00 HYPERCHOLESTEREMIA: ICD-10-CM

## 2019-12-19 DIAGNOSIS — I10 ESSENTIAL HYPERTENSION: ICD-10-CM

## 2019-12-19 DIAGNOSIS — B96.89 ACUTE BACTERIAL SINUSITIS: ICD-10-CM

## 2019-12-19 DIAGNOSIS — G47.9 SLEEPING DIFFICULTY: ICD-10-CM

## 2019-12-19 PROCEDURE — G0438 PPPS, INITIAL VISIT: HCPCS | Performed by: INTERNAL MEDICINE

## 2019-12-19 RX ORDER — AMOXICILLIN 875 MG/1
875 TABLET, COATED ORAL 2 TIMES DAILY
Qty: 14 TABLET | Refills: 0 | Status: SHIPPED | OUTPATIENT
Start: 2019-12-19 | End: 2019-12-26

## 2019-12-19 ASSESSMENT — PATIENT HEALTH QUESTIONNAIRE - PHQ9
SUM OF ALL RESPONSES TO PHQ QUESTIONS 1-9: 0
SUM OF ALL RESPONSES TO PHQ QUESTIONS 1-9: 0

## 2019-12-19 ASSESSMENT — LIFESTYLE VARIABLES
HOW OFTEN DURING THE LAST YEAR HAVE YOU HAD A FEELING OF GUILT OR REMORSE AFTER DRINKING: 0
AUDIT-C TOTAL SCORE: 1
HOW OFTEN DO YOU HAVE A DRINK CONTAINING ALCOHOL: 1
HAVE YOU OR SOMEONE ELSE BEEN INJURED AS A RESULT OF YOUR DRINKING: 0
HOW OFTEN DURING THE LAST YEAR HAVE YOU FOUND THAT YOU WERE NOT ABLE TO STOP DRINKING ONCE YOU HAD STARTED: 0
HAS A RELATIVE, FRIEND, DOCTOR, OR ANOTHER HEALTH PROFESSIONAL EXPRESSED CONCERN ABOUT YOUR DRINKING OR SUGGESTED YOU CUT DOWN: 0
HOW MANY STANDARD DRINKS CONTAINING ALCOHOL DO YOU HAVE ON A TYPICAL DAY: 0
HOW OFTEN DURING THE LAST YEAR HAVE YOU BEEN UNABLE TO REMEMBER WHAT HAPPENED THE NIGHT BEFORE BECAUSE YOU HAD BEEN DRINKING: 0
AUDIT TOTAL SCORE: 1
HOW OFTEN DO YOU HAVE SIX OR MORE DRINKS ON ONE OCCASION: 0
HOW OFTEN DURING THE LAST YEAR HAVE YOU FAILED TO DO WHAT WAS NORMALLY EXPECTED FROM YOU BECAUSE OF DRINKING: 0
HOW OFTEN DURING THE LAST YEAR HAVE YOU NEEDED AN ALCOHOLIC DRINK FIRST THING IN THE MORNING TO GET YOURSELF GOING AFTER A NIGHT OF HEAVY DRINKING: 0

## 2019-12-20 LAB
ALBUMIN SERPL-MCNC: 4.1 G/DL
ALP BLD-CCNC: 69 U/L
ALT SERPL-CCNC: 24 U/L
ANION GAP SERPL CALCULATED.3IONS-SCNC: 6 MMOL/L
AST SERPL-CCNC: 27 U/L
BILIRUB SERPL-MCNC: 0.4 MG/DL (ref 0.1–1.4)
BUN BLDV-MCNC: 11 MG/DL
CALCIUM SERPL-MCNC: 10.3 MG/DL
CHLORIDE BLD-SCNC: 101 MMOL/L
CHOLESTEROL, FASTING: 159
CO2: 31 MMOL/L
CREAT SERPL-MCNC: 0.62 MG/DL
GFR CALCULATED: 60
GLUCOSE BLD-MCNC: 108 MG/DL
HDLC SERPL-MCNC: 48 MG/DL (ref 35–70)
LDL CHOLESTEROL CALCULATED: 83 MG/DL (ref 0–160)
POTASSIUM SERPL-SCNC: 4.4 MMOL/L
SODIUM BLD-SCNC: 138 MMOL/L
TOTAL PROTEIN: 7.4
TRIGLYCERIDE, FASTING: 139

## 2019-12-27 DIAGNOSIS — E78.00 HYPERCHOLESTEREMIA: ICD-10-CM

## 2019-12-27 DIAGNOSIS — I10 ESSENTIAL HYPERTENSION: ICD-10-CM

## 2019-12-30 ENCOUNTER — TELEPHONE (OUTPATIENT)
Dept: FAMILY MEDICINE CLINIC | Age: 69
End: 2019-12-30

## 2019-12-30 RX ORDER — AMOXICILLIN AND CLAVULANATE POTASSIUM 875; 125 MG/1; MG/1
1 TABLET, FILM COATED ORAL 2 TIMES DAILY
Qty: 14 TABLET | Refills: 0 | Status: SHIPPED | OUTPATIENT
Start: 2019-12-30 | End: 2020-01-06

## 2020-01-27 ENCOUNTER — TELEPHONE (OUTPATIENT)
Dept: FAMILY MEDICINE CLINIC | Age: 70
End: 2020-01-27

## 2020-01-27 ENCOUNTER — HOSPITAL ENCOUNTER (OUTPATIENT)
Age: 70
Setting detail: SPECIMEN
Discharge: HOME | End: 2020-01-27
Payer: COMMERCIAL

## 2020-01-27 ENCOUNTER — OFFICE VISIT (OUTPATIENT)
Dept: FAMILY MEDICINE CLINIC | Age: 70
End: 2020-01-27
Payer: COMMERCIAL

## 2020-01-27 VITALS
WEIGHT: 119 LBS | SYSTOLIC BLOOD PRESSURE: 110 MMHG | DIASTOLIC BLOOD PRESSURE: 68 MMHG | HEIGHT: 68 IN | BODY MASS INDEX: 18.04 KG/M2 | OXYGEN SATURATION: 98 % | HEART RATE: 76 BPM

## 2020-01-27 LAB
-: NORMAL
REASON FOR REJECTION: NORMAL
ZZ NTE CLEAN UP: ORDERED TEST: NORMAL
ZZ NTE WITH NAME CLEAN UP: SPECIMEN SOURCE: NORMAL

## 2020-01-27 PROCEDURE — 99213 OFFICE O/P EST LOW 20 MIN: CPT | Performed by: INTERNAL MEDICINE

## 2020-01-27 RX ORDER — DICLOFENAC SODIUM 75 MG/1
TABLET, DELAYED RELEASE ORAL
Qty: 180 TABLET | Refills: 1 | Status: SHIPPED | OUTPATIENT
Start: 2020-01-27

## 2020-01-27 RX ORDER — MIRTAZAPINE 15 MG/1
TABLET, FILM COATED ORAL
Qty: 90 TABLET | Refills: 1 | Status: SHIPPED | OUTPATIENT
Start: 2020-01-27

## 2020-01-27 ASSESSMENT — ENCOUNTER SYMPTOMS
NAUSEA: 0
BLOOD IN STOOL: 0
CONSTIPATION: 0
VOMITING: 0
ABDOMINAL DISTENTION: 0
FLATUS: 0
DIARRHEA: 1
ANAL BLEEDING: 0
COUGH: 0
BLOATING: 0
ABDOMINAL PAIN: 1

## 2020-01-27 NOTE — PROGRESS NOTES
Patient presents for diarrhea states that is on an antibiotic that is causing a lot of Diarrhea since end of December      Pharmacy verified

## 2020-01-27 NOTE — TELEPHONE ENCOUNTER
[facility] called to let you know they have to cancel C Diff order because it was collected in the wrong container.

## 2020-01-27 NOTE — PROGRESS NOTES
[facility]  [facility] [address] [facility] [address]  Dept: [phone]  Dept Fax: [phone]      Lincoln Hadley is a 78 y.o. female who presents today for hermedical conditions/complaints as noted below. Lincoln Hadley is c/o of Diarrhea            HPI:     Diarrhea    This is a new problem. The current episode started 1 to 4 weeks ago. The problem occurs 5 to 10 times per day. The problem has been unchanged. The stool consistency is described as watery. The patient states that diarrhea awakens her from sleep. Associated symptoms include abdominal pain (occasional cramping) and chills. Pertinent negatives include no arthralgias, bloating, coughing, fever, headaches, increased  flatus, myalgias, sweats, URI, vomiting or weight loss. Nothing aggravates the symptoms. Risk factors include recent antibiotic use. She has tried anti-motility drug for the symptoms. The treatment provided mild relief.        No results found for: LABA1C          ( goal A1Cis < 7)   No results found for: LABMICR  LDL Calculated (mg/dL)   Date Value   12/19/2019 83   12/17/2018 65   12/13/2017 89       (goal LDL is <100)   AST (U/L)   Date Value   12/19/2019 27     ALT (U/L)   Date Value   12/19/2019 24     BUN (mg/dL)   Date Value   12/19/2019 11     BP Readings from Last 3 Encounters:   01/27/20 110/68   12/19/19 115/80   06/19/19 106/78          (goal 120/80)    Past Medical History:   Diagnosis Date    Arthritis     DJD (degenerative joint disease)     GERD (gastroesophageal reflux disease)     High cholesterol     Hypercholesteremia 11/1/2012    Unspecified essential hypertension 11/1/2012      Past Surgical History:   Procedure Laterality Date    COLONOSCOPY  7/9/2007    FOOT SURGERY      RT foot    TUBAL LIGATION         Family History   Problem Relation Age of Onset    High Blood Pressure Mother     High Cholesterol Mother     Heart Disease Mother     Parkinsonism Father  Cancer Brother         lymph nodes    Liver Disease Brother     High Blood Pressure Sister        Social History     Tobacco Use    Smoking status: Former Smoker     Packs/day: 1.50     Years: 15.00     Pack years: 22.50     Last attempt to quit: 9/15/1997     Years since quittin.3    Smokeless tobacco: Never Used   Substance Use Topics    Alcohol use: Yes     Alcohol/week: 0.0 standard drinks     Comment: social      Current Outpatient Medications   Medication Sig Dispense Refill    KLOR-CON M10 10 MEQ extended release tablet TAKE 1 TABLET TWICE A  tablet 1    NIFEdipine (PROCARDIA XL) 60 MG extended release tablet TAKE 1 TABLET DAILY 90 tablet 1    simvastatin (ZOCOR) 40 MG tablet TAKE 1 TABLET NIGHTLY 90 tablet 1    hydrochlorothiazide (HYDRODIURIL) 25 MG tablet TAKE 1 TABLET DAILY 90 tablet 1    famotidine (PEPCID) 20 MG tablet TAKE 1 TABLET TWICE A  tablet 1    mirtazapine (REMERON) 15 MG tablet TAKE 1 TABLET NIGHTLY 90 tablet 1    diclofenac (VOLTAREN) 75 MG EC tablet TAKE 1 TABLET TWICE A  tablet 1     No current facility-administered medications for this visit. Allergies   Allergen Reactions    Augmentin [Amoxicillin-Pot Clavulanate] Diarrhea       Health Maintenance   Topic Date Due    DTaP/Tdap/Td vaccine (1 - Tdap) 2020 (Originally 1961)    Shingles Vaccine (2 of 3) 2020 (Originally 10/27/2010)    Annual Wellness Visit (AWV)  2020    Lipid screen  2020    Potassium monitoring  2020    Creatinine monitoring  2020    Breast cancer screen  2021    Colon cancer screen colonoscopy  2027    DEXA (modify frequency per FRAX score)  Completed    Flu vaccine  Completed    Pneumococcal 65+ years Vaccine  Completed    Hepatitis C screen  Completed          Review of Systems   Constitutional: Positive for appetite change and chills. Negative for fever and weight loss. Respiratory: Negative for cough.

## 2020-02-05 ENCOUNTER — TELEPHONE (OUTPATIENT)
Dept: FAMILY MEDICINE CLINIC | Age: 70
End: 2020-02-05

## 2020-02-10 ENCOUNTER — TELEPHONE (OUTPATIENT)
Dept: FAMILY MEDICINE CLINIC | Age: 70
End: 2020-02-10

## 2020-02-10 NOTE — TELEPHONE ENCOUNTER
Pt states that she has had diahrea for a month and a half and doesn't know what to do. Left a message last week and no one returned her call.

## 2020-02-20 RX ORDER — FAMOTIDINE 20 MG/1
TABLET, FILM COATED ORAL
Qty: 180 TABLET | Refills: 1 | Status: SHIPPED | OUTPATIENT
Start: 2020-02-20

## 2020-02-20 NOTE — TELEPHONE ENCOUNTER
Last visit: 1/27/2020  Last Med refill: 8/16/19  Does patient have enough medication for 72 hours: No    Next Visit Date:  Future Appointments   Date Time Provider Simone Carolee   6/19/2020  9:30 AM Iliana Rodriguez  [address] Maintenance   Topic Date Due    DTaP/Tdap/Td vaccine (1 - Tdap) 06/19/2020 (Originally 7/21/1961)    Shingles Vaccine (2 of 3) 06/19/2020 (Originally 10/27/2010)    Lipid screen  12/19/2020    Annual Wellness Visit (AWV)  12/19/2020    Potassium monitoring  12/19/2020    Creatinine monitoring  12/19/2020    Breast cancer screen  03/07/2021    Colon cancer screen colonoscopy  11/06/2027    DEXA (modify frequency per FRAX score)  Completed    Flu vaccine  Completed    Pneumococcal 65+ years Vaccine  Completed    Hepatitis C screen  Completed    Hepatitis A vaccine  Aged Out    Hepatitis B vaccine  Aged Out    Hib vaccine  Aged Out    Meningococcal (ACWY) vaccine  Aged Out       No results found for: LABA1C          ( goal A1C is < 7)   No results found for: LABMICR  LDL Calculated (mg/dL)   Date Value   12/19/2019 83   12/17/2018 65       (goal LDL is <100)   AST (U/L)   Date Value   12/19/2019 27     ALT (U/L)   Date Value   12/19/2019 24     BUN (mg/dL)   Date Value   12/19/2019 11     BP Readings from Last 3 Encounters:   01/27/20 110/68   12/19/19 115/80   06/19/19 106/78          (goal 120/80)    All Future Testing planned in [facility]              Patient Active Problem List:     Essential hypertension     Hypercholesteremia     GERD (gastroesophageal reflux disease)     DJD (degenerative joint disease)     Arthritis     Sleeping difficulty

## 2020-02-25 ENCOUNTER — NURSE TRIAGE (OUTPATIENT)
Dept: OTHER | Facility: CLINIC | Age: 70
End: 2020-02-25

## 2020-02-25 ENCOUNTER — TELEPHONE (OUTPATIENT)
Dept: FAMILY MEDICINE CLINIC | Age: 70
End: 2020-02-25

## 2020-02-25 NOTE — TELEPHONE ENCOUNTER
fever. Denies hx of asthma or COPD. Caller reports symptoms as documented above. Caller informed of disposition. Soft transfer to pre-service center to schedule appointment as recommended. Care advice as documented. Pt verbalized understanding and agreeable to plan. Please do not respond to the triage nurse through this encounter. Any subsequent communication should be directly with the patient.

## 2020-03-09 ENCOUNTER — TELEPHONE (OUTPATIENT)
Dept: FAMILY MEDICINE CLINIC | Age: 70
End: 2020-03-09

## 2020-03-09 RX ORDER — NIFEDIPINE 60 MG/1
TABLET, EXTENDED RELEASE ORAL
Qty: 90 TABLET | Refills: 1 | Status: SHIPPED | OUTPATIENT
Start: 2020-03-09

## 2020-03-09 RX ORDER — SIMVASTATIN 40 MG
TABLET ORAL
Qty: 90 TABLET | Refills: 1 | Status: SHIPPED | OUTPATIENT
Start: 2020-03-09

## 2020-03-09 NOTE — TELEPHONE ENCOUNTER
Pt called asking for mammogram order (send to [facility]) and wants a referral to PUL (Dr Mesha Rogers), did not state way. I've pended both.

## 2020-03-09 NOTE — TELEPHONE ENCOUNTER
She will need to provide a reason for pulm referral - I cannot refer her without having a reason. Please review with patient. The other two orders have been signed.

## 2020-03-09 NOTE — TELEPHONE ENCOUNTER
Last visit: 1/27/20  Last Med refill: 9/19/19  Does patient have enough medication for 72 hours: Yes    Next Visit Date:  Future Appointments   Date Time Provider Simone Carolee   6/19/2020  9:30 AM Iliana Rose  [address] Maintenance   Topic Date Due    DTaP/Tdap/Td vaccine (1 - Tdap) 06/19/2020 (Originally 7/21/1969)    Shingles Vaccine (2 of 3) 06/19/2020 (Originally 10/27/2010)    Lipid screen  12/19/2020    Annual Wellness Visit (AWV)  12/19/2020    Potassium monitoring  12/19/2020    Creatinine monitoring  12/19/2020    Breast cancer screen  03/07/2021    Colon cancer screen colonoscopy  11/06/2027    DEXA (modify frequency per FRAX score)  Completed    Flu vaccine  Completed    Pneumococcal 65+ years Vaccine  Completed    Hepatitis C screen  Completed    Hepatitis A vaccine  Aged Out    Hepatitis B vaccine  Aged Out    Hib vaccine  Aged Out    Meningococcal (ACWY) vaccine  Aged Out       No results found for: LABA1C          ( goal A1C is < 7)   No results found for: LABMICR  LDL Calculated (mg/dL)   Date Value   12/19/2019 83   12/17/2018 65       (goal LDL is <100)   AST (U/L)   Date Value   12/19/2019 27     ALT (U/L)   Date Value   12/19/2019 24     BUN (mg/dL)   Date Value   12/19/2019 11     BP Readings from Last 3 Encounters:   01/27/20 110/68   12/19/19 115/80   06/19/19 106/78          (goal 120/80)    All Future Testing planned in [facility]              Patient Active Problem List:     Essential hypertension     Hypercholesteremia     GERD (gastroesophageal reflux disease)     DJD (degenerative joint disease)     Arthritis     Sleeping difficulty

## 2020-03-10 NOTE — TELEPHONE ENCOUNTER
Spoke with pt and she states that she has been coughing up blood, had went to urgent care, they gave her medication, and she was told if the medicine did not get rid of the issue, she would need a CT of her lungs.  So she would like to see a pulmonologist.

## 2020-03-10 NOTE — TELEPHONE ENCOUNTER
Has her issue resolved or is it ongoing?  Acute hemoptysis needs further eval prior to referral, because she may really just need a referral to ID rather than pulm based on test results

## 2020-03-12 NOTE — TELEPHONE ENCOUNTER
Pt is still having the issues. She states is a former smoker and knows what she needs, to see PULM. She is upset and wants referral sent please. She said has been having the issue since [address] [facility] [phone] and nothing has been done (said was ignored,?).   I sent to  on [facility]rientos for visit note and any testing that was done there

## 2020-03-13 NOTE — TELEPHONE ENCOUNTER
Pt called in stating that she is going to go back to urgent care, because she was told that if her PCP did not refer to PULM, they would order a CT an that could get her referred. Pt seems clearly convinced she has cancer, and did not want to entertain the idea of ID. I did explain to her that we were waiting for progress notes from  so you could then further decide what to do with the situation. Pt stated that \"since my Dr refuses to help me and is ignoring my problems, I'm just gonna handle it. \" and hung up.

## 2020-04-02 RX ORDER — POTASSIUM CHLORIDE 750 MG/1
TABLET, EXTENDED RELEASE ORAL
Qty: 180 TABLET | Refills: 1 | Status: SHIPPED | OUTPATIENT
Start: 2020-04-02

## 2020-04-08 RX ORDER — HYDROCHLOROTHIAZIDE 25 MG/1
TABLET ORAL
Qty: 90 TABLET | Refills: 1 | Status: SHIPPED | OUTPATIENT
Start: 2020-04-08

## 2020-07-10 ENCOUNTER — APPOINTMENT (OUTPATIENT)
Dept: GENERAL RADIOLOGY | Age: 70
End: 2020-07-10
Payer: COMMERCIAL

## 2020-07-10 ENCOUNTER — HOSPITAL ENCOUNTER (EMERGENCY)
Age: 70
Discharge: HOME | End: 2020-07-11
Attending: EMERGENCY MEDICINE
Payer: COMMERCIAL

## 2020-07-10 VITALS
OXYGEN SATURATION: 95 % | HEIGHT: 67 IN | HEART RATE: 81 BPM | WEIGHT: 119 LBS | DIASTOLIC BLOOD PRESSURE: 59 MMHG | SYSTOLIC BLOOD PRESSURE: 122 MMHG | RESPIRATION RATE: 18 BRPM | BODY MASS INDEX: 18.68 KG/M2 | TEMPERATURE: 98.6 F

## 2020-07-10 PROCEDURE — 71046 X-RAY EXAM CHEST 2 VIEWS: CPT

## 2020-07-10 PROCEDURE — 93005 ELECTROCARDIOGRAM TRACING: CPT | Performed by: STUDENT IN AN ORGANIZED HEALTH CARE EDUCATION/TRAINING PROGRAM

## 2020-07-10 PROCEDURE — 99285 EMERGENCY DEPT VISIT HI MDM: CPT

## 2020-07-10 RX ORDER — LORAZEPAM 1 MG/1
1 TABLET ORAL EVERY 6 HOURS PRN
COMMUNITY

## 2020-07-10 RX ORDER — PROCHLORPERAZINE MALEATE 10 MG
10 TABLET ORAL EVERY 6 HOURS PRN
COMMUNITY

## 2020-07-10 RX ORDER — ONDANSETRON HYDROCHLORIDE 8 MG/1
8 TABLET, FILM COATED ORAL EVERY 8 HOURS PRN
COMMUNITY

## 2020-07-10 RX ORDER — NIFEDIPINE 60 MG/1
TABLET, EXTENDED RELEASE ORAL
Qty: 90 TABLET | Refills: 1 | OUTPATIENT
Start: 2020-07-10

## 2020-07-11 LAB
EKG ATRIAL RATE: 87 BPM
EKG P AXIS: 48 DEGREES
EKG P-R INTERVAL: 162 MS
EKG Q-T INTERVAL: 504 MS
EKG QRS DURATION: 80 MS
EKG QTC CALCULATION (BAZETT): 606 MS
EKG R AXIS: 35 DEGREES
EKG T AXIS: 42 DEGREES
EKG VENTRICULAR RATE: 87 BPM

## 2020-07-11 PROCEDURE — 93010 ELECTROCARDIOGRAM REPORT: CPT | Performed by: INTERNAL MEDICINE

## 2020-07-11 NOTE — ED NOTES
Pt drops into the 89 without O2. Dr Nicole Ortega turned off the NC and is ok with her O2 sat.      Jeimy Arguello RN  07/10/20 [id number]

## 2020-07-11 NOTE — ED PROVIDER NOTES
[address] [facility]     Emergency Department     Faculty Attestation    I performed a history and physical examination of the patient and discussed management with the resident. I reviewed the residents note and agree with the documented findings including all diagnostic interpretations and plan of care. Any areas of disagreement are noted on the chart. I was personally present for the key portions of any procedures. I have documented in the chart those procedures where I was not present during the key portions. I have reviewed the emergency nurses triage note. I agree with the chief complaint, past medical history, past surgical history, allergies, medications, social and family history as documented unless otherwise noted below. Documentation of the HPI, Physical Exam and Medical Decision Making performed by scribglendy is based on my personal performance of the HPI, PE and MDM. For Physician Assistant/ Nurse Practitioner cases/documentation I have personally evaluated this patient and have completed at least one if not all key elements of the E/M (history, physical exam, and MDM). Additional findings are as noted. This patient was evaluated in the Emergency Department for symptoms described in the history of present illness. He/she was evaluated in the context of the global COVID-19 pandemic, which necessitated consideration that the patient might be at risk for infection with the SARS-CoV-2 virus that causes COVID-19. Institutional protocols and algorithms that pertain to the evaluation of patients at risk for COVID-19 are in a state of rapid change based on information released by regulatory bodies including the CDC and federal and state organizations. These policies and algorithms were followed during the patient's care in the ED. Primary Care Physician: No primary care provider on file. History:  This is a 78 y.o. female who presents to the Emergency Department with complaint of shortness of breath. Sudden onset after taking a THC gummy. Improved now and is near baseline. No chest pain. No fevers no change in cough. Reported anxiety with this. Is currently on chemotherapy last session was 1 week ago. EMS placed a butterfly needle into port and started fluid infusion prior to arrival    Physical:     height is 5' 7\" (1.702 m) and weight is 119 lb (54 kg). Her oral temperature is 98.6 °F (37 °C). Her blood pressure is 123/61 and her pulse is 91. Her respiration is 18 and oxygen saturation is 96%.    78 y.o. female no acute distress, cardiac exam regular rate and rhythm no murmurs rubs gallops, pulmonary clear bilaterally abdomen soft nontender nondistended. Answers questions appropriately, alert oriented. Impression: Suspect medication reaction    Plan: Chest x-ray and EKG screening for abnormalities however given history and otherwise benign exam I would not pursue more aggressive work-up at this time I did discuss this with the patient and she is in agreement with this plan. I have no significant suspicion for infectious etiology, ACS however will carefully evaluate EKG    EKG Interpretation    Interpreted by me    Rhythm: normal sinus   Rate: normal  Axis: normal  Ectopy: none  Conduction: normal -  read by the machine however on evaluation of the QT interval the T wave ends before the intermediate kat on the R-R wave so I suspect that this is artifactual and incorrect  ST Segments: no acute change  T Waves: no acute change  Q Waves: none    Clinical Impression: no acute changes and normal EKG      Susan Paniagua MD, [facility]  Attending Emergency Physician        Yajaira Berman MD  07/10/20 1033    10:53 PM EDT chest x-ray read as possible airspace disease in the right central to upper lobe however this is consistent with location of her known small cell lung carcinoma. This appears chronic based on review of prior radiographic results. Patient has no infectious symptoms reports that her baseline cough is actually improved compared with prior and I do not believe that this represents a pneumonia and this is more likely scarring/her known malignancy         Cayetano Vega MD  07/10/20 0709

## 2020-07-11 NOTE — ED NOTES
Pt arrives to ED via [facility]. Pt complains of SOB. Pt states upon arrival her SOB has subsided. Pt states she took a THC gummy today which she has never done before. She currently has no complaints. Pt has stage 3 lung cancer and just finished chemo and radiation this week. pta at home pt had orthostatic hypotension according to [facility]. Pt states when she stands up she gets super dizzy. mpt arrives alert and oriented x4. Pt VSS. Pt placed on full monitor. For some reason LS3 accessed pt chemo port, that was not done here at [facility].                  Cesar Garcia RN  07/10/20 2155

## 2020-07-11 NOTE — ED PROVIDER NOTES
[address]  Emergency Department Encounter  Emergency Medicine Resident         This patient was evaluated in the Emergency Department for symptoms described in the history of present illness. He/she was evaluated in the context of the global COVID-19 pandemic, which necessitated consideration that the patient might be at risk for infection with the SARS-CoV-2 virus that causes COVID-19. Institutional protocols and algorithms that pertain to the evaluation of patients at risk for COVID-19 are in a state of rapid change based on information released by regulatory bodies including the CDC and federal and state organizations. These policies and algorithms were followed during the patient's care in the ED. Pt Name: Jaleel Morse  MRN: [id number]  [place] 1950  Date of evaluation: 7/10/20  PCP:  No primary care provider on file. CHIEF COMPLAINT       Chief Complaint   Patient presents with    Shortness of Breath       HISTORY OF PRESENT ILLNESS  (Location/Symptom, Timing/Onset, Context/Setting, Quality, Duration, Modifying Factors, Severity.)    Jaleel Morse is a 78 y.o. female who presents with a breath after eating a marijuana gummy bear. Patient became anxious more conscious of her breathing and became short of breath, did not have any palpitations or syncopal episodes no chest pain nausea vomiting ripping or tearing sensation. Upon arrival to the emergency department, she feels that her symptoms have almost completely resolved. PAST MEDICAL / SURGICAL / SOCIAL / FAMILY HISTORY    has a past medical history of Arthritis, DJD (degenerative joint disease), GERD (gastroesophageal reflux disease), High cholesterol, Hypercholesteremia, and Unspecified essential hypertension. has a past surgical history that includes Colonoscopy (7/9/2007); Tubal ligation; and Foot surgery.     Social History     Socioeconomic History    Marital status:      Spouse name: Not on file Anxiety (one hr prior to tx.). Yes Historical Provider, MD   ondansetron (ZOFRAN) 8 MG tablet Take 8 mg by mouth every 8 hours as needed for Nausea or Vomiting   Yes Historical Provider, MD   prochlorperazine (COMPAZINE) 10 MG tablet Take 10 mg by mouth every 6 hours as needed   Yes Historical Provider, MD   hydroCHLOROthiazide (HYDRODIURIL) 25 MG tablet TAKE 1 TABLET DAILY 4/8/20   Yusra Alberts, APRN - CNP   KLOR-CON M10 10 MEQ extended release tablet TAKE 1 TABLET TWICE A DAY 4/2/20   Iliana Kaur MD   simvastatin (ZOCOR) 40 MG tablet TAKE 1 TABLET NIGHTLY 3/9/20   Iliana Kaur MD   NIFEdipine (PROCARDIA XL) 60 MG extended release tablet TAKE 1 TABLET DAILY 3/9/20   Iliana Kaur MD   famotidine (PEPCID) 20 MG tablet TAKE 1 TABLET TWICE A DAY 2/20/20   Iliana Kaur MD   diclofenac (VOLTAREN) 75 MG EC tablet TAKE 1 TABLET TWICE A DAY 1/27/20   Iliana Kaur MD   mirtazapine (REMERON) 15 MG tablet TAKE 1 TABLET NIGHTLY 1/27/20   Iliana Kaur MD       REVIEW OF SYSTEMS    (2-9 systems for level 4, 10 or more for level 5)      Gen: No Fever, No chills  EYES: No blurry visiion, no double vision  HENT: No sore throat, No runny nose. No cough  CV: No CP , No palpitation  RESP: +SOB, No respiratory distress  GI: No N/V, No Abdm pain  : No dysuria  SKIN: No rash  MSK: No back pain, no joint pain  NEURO: No HA, no weakness  PSYCH: No SI/HI        PHYSICAL EXAM   (up to 7 for level 4, 8 or more for level 5)     INITIAL VITALS:     BP (!) 122/59   Pulse 81   Temp 98.6 °F (37 °C) (Oral)   Resp 18   Ht 5' 7\" (1.702 m)   Wt 119 lb (54 kg)   SpO2 95%   BMI 18.64 kg/m²     Physical Exam  Patient is awake alert oriented cooperative moving all extremities frail appearing however nontoxic. Answering questions in full sentences without with difficulty there is no tripoding or accessory muscle use. Respirations are even and unlabored.   No JVD trachea is midline normocephalic atraumatic nose normal there is a Chemo-Port on patient, no rash or overlying skin changes suggestive of infectious etiology. No murmurs rubs or gallops. Extractor movements are intact no dysarthria or aphasia. Good muscle strength in bilateral lateral upper and lower extremities 5/5. No rash. DIFFERENTIAL  DIAGNOSIS/ MDM   PLAN (LABS / IMAGING / EKG):  Orders Placed This Encounter   Procedures    XR CHEST STANDARD (2 VW)    EKG 12 Lead       MEDICATIONS ORDERED:  No orders of the defined types were placed in this encounter. MDM:    Jaleel Morse is a 78 y.o. female who presents with shortness of breath that had a sudden onset after taking a THC gummy. Patient symptoms have significantly improved she feels back to her baseline. EKG was obtained as well as chest x-ray. EKG demonstrating normal sinus rhythm with ventricular rate of 87 parable 162 QRS 80, QT  (E this is calculated by the machine is likely secondary to artifact reading off of the improper interval as the T wave ends prior to the half-life plan between R-R interval.  No STEMI. Unable to pull patient's prior films however patient does have small cell lung cancer of the right lobe. Will attempt to obtain CT scan images from [facility] to assess stabiity. I do not have concern for PNA at this time as she does not have fever, cough, and should not have inflammatory response at this point due to chemo and radiation          EMERGENCY DEPARTMENT COURSE:         DIAGNOSTIC RESULTS / EMERGENCYDEPARTMENT COURSE   LABS:  Labs Reviewed - No data to display    RADIOLOGY:  Xr Chest Standard (2 Vw)    Result Date: 7/10/2020  EXAMINATION: TWO XRAY VIEWS OF THE CHEST 7/10/2020 9:22 pm COMPARISON: No priors available. HISTORY: ORDERING SYSTEM PROVIDED HISTORY: short breath TECHNOLOGIST PROVIDED HISTORY: short breath Reason for Exam: SOB FINDINGS: Right hemidiaphragm is significantly elevated. There is a right sided IJ infusion port terminating SVC. Patchy airspace disease in the right mid just above the minor fissure probably in the upper lobe. Difficult to see on the lateral view. Number night is likely. Left lung clear. Bones grossly intact. 1. Patchy airspace disease in the right mid lung probably upper lobe may represent pneumonia. 2. Infusion port is in place on the right terminating SVC. 3. Elevated right hemidiaphragm. CONSULTS:  None    CRITICAL CARE:  Please see attending note    FINAL IMPRESSION     1. Marijuana use          DISPOSITION / PLAN   DISPOSITION Decision To Discharge 07/10/2020 10:52:26 PM       Evaluation and treatment course in the ED, and plan of care upon discharge was discussed in length with the patient. Patient had no further questions prior to being discharged and was instructed to return to the ED for new or worsening symptoms. Any changes to existing medications or new prescriptions were reviewed with patient and they expressed understanding of how to correctly take their medications and the possible common side effects. The patient understands that at this time there is no evidence for a more malignant underlying process, but the patient also understands that early in the process of an illness or injury, an emergency department workup can be falsely reassuring. Routine discharge counseling was given, and the patient understands that worsening, changing or persistent symptoms should prompt an immediate call or follow up with his/her primary physician or return to the emergency department. The importance of appropriate follow up was also discussed. More extensive discharge instructions were given in the patients discharge paperwork.     PATIENT REFERRED TO:  [address] [place] [address] AT [address] [address] [phone]  Schedule an appointment as soon as possible for a visit in 2 days  Return to the ER if worsening or any other concern      DISCHARGE MEDICATIONS:  New Prescriptions No medications on file       Dr. Sirisha Coates.  [place] DO  Emergency Medicine Resident Physician, PGY-3    (Please note that portions of this note were completed with a voice recognition program.  Efforts were made to edit the dictations but occasionally words are mis-transcribed.)          Avery Lopez DO  Resident  07/10/20 6183

## 2020-07-11 NOTE — ED NOTES
Pt back from [address] [place] [address], [address] [place] [address]  07/10/20 5888

## 2020-07-11 NOTE — ED NOTES
Bed: 23  Expected date: 7/10/20  Expected time: 8:31 PM  Means of arrival: [facility]  Comments:   [facility], AMINA  07/10/20 2041

## 2020-07-13 ENCOUNTER — CARE COORDINATION (OUTPATIENT)
Dept: CARE COORDINATION | Age: 70
End: 2020-07-13

## 2020-07-13 NOTE — CARE COORDINATION
Author provided contact information for future reference. Patient given information for Get[facility] Loop and agrees to enroll : no  Patient's preferred e-mail:    Patient's preferred phone number:   Based on Loop alert triggers, patient will be contacted by nurse care manager for worsening symptoms.   Pt declines the GetWell Loop   [facility]. will follow up with pt on 07/21/20

## 2020-07-16 ENCOUNTER — CARE COORDINATION (OUTPATIENT)
Dept: CARE COORDINATION | Age: 70
End: 2020-07-16

## 2020-07-16 NOTE — CARE COORDINATION
Patient contacted regarding COVID-19 risk and screening. This author contacted the patient by telephone to perform follow-up call. Verified name and  with patient as identifiers. Symptoms reviewed with patient. Patient reports symptoms are the same. Due to worsening symptoms the RN CTN/ACM was not notified for escalation, Due to her being at [facility] ER right now      Patient who was given an opportunity for questions and concerns. Writer spoke to pt briefly-Pt she is still having SOB and chest pain. Pt states she is at [facility] ER right now as we speak. Writer advised pt to follow up with PCP when discharged.

## 2020-07-28 ENCOUNTER — CARE COORDINATION (OUTPATIENT)
Dept: CARE COORDINATION | Age: 70
End: 2020-07-28

## 2020-07-28 NOTE — CARE COORDINATION
Patient contacted regarding COVID-19 risk and screening. This author contacted the patient by telephone to perform follow-up call. Verified name and  with patient as identifiers. Symptoms reviewed with patient. Patient reports symptoms are improving. Due to no new or worsening symptoms the RN CTN/ACM was not notified for escalation. This author reviewed discharge instructions, medical action plan and red flags such as increased shortness of breath, increasing fever, worsening cough or chest pain with patient who verbalized understanding. Discussed exposure protocols and quarantine with CDC Guidelines What To Do If You Are Sick    Patient who was given an opportunity for questions and concerns. The patient agrees to contact the [facility] exposure line [phone], [facility] department [place] Department of Health: ([phone])[facility] PCP office for questions related to their healthcare. Author provided contact information for future reference. Pt contacted pt for a f/u, pt reports that she's not experiencing any shortness of breath. Pt reports that she's feeling so much better and has self quarantined herself. Pt reports that she has consulted her doctor.   Plan of Care   [facility] [place], [facility]. will f/u with pt on 20

## 2020-08-04 ENCOUNTER — CARE COORDINATION (OUTPATIENT)
Dept: CARE COORDINATION | Age: 70
End: 2020-08-04

## 2020-08-04 NOTE — CARE COORDINATION
Attempted to contact pt for final follow up for COVID -19 Monitoring. There was no answer, message stated that wireless customer wasn't   available. HC will conclude pt's episode at this time.

## 2020-10-07 RX ORDER — HYDROCHLOROTHIAZIDE 25 MG/1
TABLET ORAL
Qty: 90 TABLET | Refills: 1 | OUTPATIENT
Start: 2020-10-07

## 2020-10-07 RX ORDER — NIFEDIPINE 60 MG/1
TABLET, EXTENDED RELEASE ORAL
Qty: 90 TABLET | Refills: 1 | OUTPATIENT
Start: 2020-10-07

## 2020-10-07 RX ORDER — POTASSIUM CHLORIDE 750 MG/1
TABLET, EXTENDED RELEASE ORAL
Qty: 180 TABLET | Refills: 1 | OUTPATIENT
Start: 2020-10-07

## 2020-10-19 RX ORDER — POTASSIUM CHLORIDE 750 MG/1
TABLET, EXTENDED RELEASE ORAL
Qty: 180 TABLET | Refills: 1 | OUTPATIENT
Start: 2020-10-19

## 2020-10-19 RX ORDER — NIFEDIPINE 60 MG/1
TABLET, EXTENDED RELEASE ORAL
Qty: 90 TABLET | Refills: 1 | OUTPATIENT
Start: 2020-10-19

## 2020-10-19 RX ORDER — HYDROCHLOROTHIAZIDE 25 MG/1
TABLET ORAL
Qty: 90 TABLET | Refills: 1 | OUTPATIENT
Start: 2020-10-19

## 2020-10-21 ENCOUNTER — TELEPHONE (OUTPATIENT)
Dept: FAMILY MEDICINE CLINIC | Age: 70
End: 2020-10-21

## 2020-11-03 PROBLEM — M19.90 DJD (DEGENERATIVE JOINT DISEASE): Status: RESOLVED | Noted: 2020-11-03 | Resolved: 2020-11-03

## 2021-04-01 DIAGNOSIS — K21.9 GASTROESOPHAGEAL REFLUX DISEASE: ICD-10-CM

## 2021-04-01 RX ORDER — FAMOTIDINE 20 MG/1
TABLET, FILM COATED ORAL
Qty: 180 TABLET | Refills: 1 | OUTPATIENT
Start: 2021-04-01

## 2021-05-22 ENCOUNTER — HOSPITAL ENCOUNTER (EMERGENCY)
Age: 71
Discharge: HOME | End: 2021-05-22
Attending: EMERGENCY MEDICINE
Payer: COMMERCIAL

## 2021-05-22 VITALS
HEIGHT: 66 IN | DIASTOLIC BLOOD PRESSURE: 80 MMHG | TEMPERATURE: 98.3 F | HEART RATE: 112 BPM | WEIGHT: 120 LBS | RESPIRATION RATE: 16 BRPM | BODY MASS INDEX: 19.29 KG/M2 | SYSTOLIC BLOOD PRESSURE: 127 MMHG | OXYGEN SATURATION: 91 %

## 2021-05-22 DIAGNOSIS — G89.29 CHRONIC LOW BACK PAIN, UNSPECIFIED BACK PAIN LATERALITY, UNSPECIFIED WHETHER SCIATICA PRESENT: Primary | ICD-10-CM

## 2021-05-22 DIAGNOSIS — M54.50 CHRONIC LOW BACK PAIN, UNSPECIFIED BACK PAIN LATERALITY, UNSPECIFIED WHETHER SCIATICA PRESENT: Primary | ICD-10-CM

## 2021-05-22 PROCEDURE — 6370000000 HC RX 637 (ALT 250 FOR IP): Performed by: STUDENT IN AN ORGANIZED HEALTH CARE EDUCATION/TRAINING PROGRAM

## 2021-05-22 PROCEDURE — 99284 EMERGENCY DEPT VISIT MOD MDM: CPT

## 2021-05-22 RX ORDER — LEVOTHYROXINE SODIUM 0.07 MG/1
75 TABLET ORAL DAILY
COMMUNITY

## 2021-05-22 RX ORDER — DRONABINOL 2.5 MG/1
2.5 CAPSULE ORAL
COMMUNITY

## 2021-05-22 RX ORDER — CODEINE PHOSPHATE AND GUAIFENESIN 10; 100 MG/5ML; MG/5ML
5 SOLUTION ORAL 3 TIMES DAILY PRN
COMMUNITY

## 2021-05-22 RX ORDER — OMEPRAZOLE 20 MG/1
20 CAPSULE, DELAYED RELEASE ORAL DAILY
COMMUNITY

## 2021-05-22 RX ORDER — OXYCODONE HYDROCHLORIDE 5 MG/1
10 TABLET ORAL ONCE
Status: COMPLETED | OUTPATIENT
Start: 2021-05-22 | End: 2021-05-22

## 2021-05-22 RX ORDER — OXYCODONE HYDROCHLORIDE 5 MG/1
10 TABLET ORAL EVERY 6 HOURS PRN
Qty: 12 TABLET | Refills: 0 | Status: SHIPPED | OUTPATIENT
Start: 2021-05-22 | End: 2021-05-25

## 2021-05-22 RX ADMIN — OXYCODONE HYDROCHLORIDE 10 MG: 5 TABLET ORAL at 18:53

## 2021-05-22 ASSESSMENT — PAIN SCALES - GENERAL
PAINLEVEL_OUTOF10: 9
PAINLEVEL_OUTOF10: 9

## 2021-05-22 NOTE — ED PROVIDER NOTES
[address] ED     Emergency Department     Faculty Attestation        I performed a history and physical examination of the patient and discussed management with the resident. I reviewed the residents note and agree with the documented findings and plan of care. Any areas of disagreement are noted on the chart. I was personally present for the key portions of any procedures. I have documented in the chart those procedures where I was not present during the key portions. I have reviewed the emergency nurses triage note. I agree with the chief complaint, past medical history, past surgical history, allergies, medications, social and family history as documented unless otherwise noted below. For Physician Assistant/ Nurse Practitioner cases/documentation I have personally evaluated this patient and have completed at least one if not all key elements of the E/M (history, physical exam, and MDM). Additional findings are as noted. Vital Signs: BP: 127/80  Pulse: 112  Resp: 16  Temp: 98.3 °F (36.8 °C) SpO2: 91 %  PCP:  No primary care provider on file. Pertinent Comments:         Critical Care  None    This patient was evaluated in the Emergency Department for symptoms described in the history of present illness. He/she was evaluated in the context of the global COVID-19 pandemic, which necessitated consideration that the patient might be at risk for infection with the SARS-CoV-2 virus that causes COVID-19. Institutional protocols and algorithms that pertain to the evaluation of patients at risk for COVID-19 are in a state of rapid change based on information released by regulatory bodies including the CDC and federal and state organizations. These policies and algorithms were followed during the patient's care in the ED.     (Please note that portions of this note were completed with a voice recognition program. Efforts were made to edit the dictations but occasionally words are mis-transcribed.  Whenever words are used in this note in any gender, they shall be construed as though they were used in the gender appropriate to the circumstances; and whenever words are used in this note in the singular or plural form, they shall be construed as though they were used in the form appropriate to the circumstances.)    MD Doretha Davidson  Attending Emergency Medicine Physician             Sharon Mayen MD  05/22/21 1910

## 2021-05-22 NOTE — ED NOTES
Pt to ed via personal wheel chair with sisters to ED. Pt assisted to bed with 2 person assist. Pt has history of lung cancer with metastasis to the liver. Pt has opted to not undergo treatment, has a  with hospice and is being evaluated for hospice care. Pt sisters states pt has been weak, fatigued, tried, sleeping a lot with little po intake. Pt newly prescribed WC and walker. Pt states she fell today, tripped and fell and injured her back. Pt family placed lidocaine patches to her back to attempt to ease the pain. Pt c/o diffuse back pain, pt rates pain 9/10. Pt states they called her oncologist but she is not prescribed pain mediation and they would not prescribe her anything for the pain over the phone. Pt asking for water, awaiting resident to see.       Gaston Peck RN  05/22/21 [id number]

## 2021-05-22 NOTE — ED PROVIDER NOTES
[address]  ED  Emergency Department Encounter  Emergency Medicine Resident     Pt Name: Monae Camacho  MRN: [id number]  [place] 1950  Date of evaluation: 21  PCP:  No primary care provider on file. CHIEF COMPLAINT       Chief Complaint   Patient presents with    Back Pain       HISTORY OFPRESENT ILLNESS  (Location/Symptom, Timing/Onset, Context/Setting, Quality, Duration, Modifying Larissa Marinelli.)      Monae Camacho is a 69year old female who presents with acute on chronic back pain. The patient has stage IV lung cancer with mets to her liver. The patient reports that she is DNR comfort care. She does not have any pain medication through her nursing service at this time she is only been receiving anxiety meds and Zofran. Patient reports that she had a fall this morning when she landed on her buttocks because she got lightheaded. Did not hit her head or lose consciousness. She has been having worsening back pain since the fall. She does not want any imaging or lab testing at this time as she is comfort care. PAST MEDICAL / SURGICAL / SOCIAL / FAMILY HISTORY      has a past medical history of Arthritis, Cancer ([address] DJD (degenerative joint disease), GERD (gastroesophageal reflux disease), High cholesterol, Hypercholesteremia, and Unspecified essential hypertension. has a past surgical history that includes Colonoscopy (2007); Tubal ligation; and Foot surgery. Social History     Socioeconomic History    Marital status:      Spouse name: Not on file    Number of children: Not on file    Years of education: Not on file    Highest education level: Not on file   Occupational History    Not on file   Tobacco Use    Smoking status: Former Smoker     Packs/day: 1.50     Years: 15.00     Pack years: 22.50     Quit date: 9/15/1997     Years since quittin.7    Smokeless tobacco: Never Used   Substance and Sexual Activity    Alcohol use:  Yes Alcohol/week: 0.0 standard drinks     Comment: social    Drug use: No    Sexual activity: Not on file   Other Topics Concern    Not on file   Social History Narrative    Not on file     Social Determinants of Health     Financial Resource Strain:     Difficulty of Paying Living Expenses:    Food Insecurity:     Worried About Running Out of Food in the Last Year:     [address] in the Last Year:    Transportation Needs:     Lack of Transportation (Medical):  Lack of Transportation (Non-Medical):    Physical Activity:     Days of Exercise per Week:     Minutes of Exercise per Session:    Stress:     Feeling of Stress :    Social Connections:     Frequency of Communication with Friends and Family:     Frequency of Social Gatherings with Friends and Family:     Attends Hinduism Services:     Active Member of Clubs or Organizations:     Attends Club or Organization Meetings:     Marital Status:    Intimate Partner Violence:     Fear of Current or Ex-Partner:     Emotionally Abused:     Physically Abused:     Sexually Abused:        Family History   Problem Relation Age of Onset    High Blood Pressure Mother     High Cholesterol Mother     Heart Disease Mother     Parkinsonism Father     Cancer Brother         lymph nodes    Liver Disease Brother     High Blood Pressure Sister         Allergies:  Augmentin [amoxicillin-pot clavulanate] and Bactrim [sulfamethoxazole-trimethoprim]    Home Medications:  Prior to Admission medications    Medication Sig Start Date End Date Taking? Authorizing Provider   Nutritional Supplements (BOOST COMPACT PO) Take by mouth   Yes Historical Provider, MD   guaiFENesin-codeine (GUAIFENESIN AC) 100-10 MG/5ML liquid Take 5 mLs by mouth 3 times daily as needed for Cough.    Yes Historical Provider, MD   Potassium Chloride (KLOR-CON PO) Take 10 mEq by mouth   Yes Historical Provider, MD   omeprazole (PRILOSEC) 20 MG delayed release capsule Take 20 mg by mouth daily   Yes Historical Provider, MD   dronabinol (MARINOL) 2.5 MG capsule Take 2.5 mg by mouth 2 times daily (before meals). Yes Historical Provider, MD   levothyroxine (SYNTHROID) 75 MCG tablet Take 75 mcg by mouth Daily   Yes Historical Provider, MD   oxyCODONE (ROXICODONE) 5 MG immediate release tablet Take 2 tablets by mouth every 6 hours as needed for Pain for up to 3 days. Intended supply: 3 days. Take lowest dose possible to manage pain 5/22/21 5/25/21 Yes Elsy West MD   ondansetron (ZOFRAN) 8 MG tablet Take 8 mg by mouth every 8 hours as needed for Nausea or Vomiting   Yes Historical Provider, MD   prochlorperazine (COMPAZINE) 10 MG tablet Take 10 mg by mouth every 6 hours as needed   Yes Historical Provider, MD   hydroCHLOROthiazide (HYDRODIURIL) 25 MG tablet TAKE 1 TABLET DAILY 4/8/20  Yes ANNABELLE Tatum CNP   simvastatin (ZOCOR) 40 MG tablet TAKE 1 TABLET NIGHTLY 3/9/20  Yes Iliana Coppola MD   NIFEdipine (PROCARDIA XL) 60 MG extended release tablet TAKE 1 TABLET DAILY 3/9/20  Yes Iliana Coppola MD   famotidine (PEPCID) 20 MG tablet TAKE 1 TABLET TWICE A DAY 2/20/20  Yes Iliana Coppola MD   mirtazapine (REMERON) 15 MG tablet TAKE 1 TABLET NIGHTLY 1/27/20  Yes Iliana Coppola MD   LORazepam (ATIVAN) 1 MG tablet Take 1 mg by mouth every 6 hours as needed for Anxiety (one hr prior to tx.). Historical Provider, MD   KLOR-CON M10 10 MEQ extended release tablet TAKE 1 TABLET TWICE A DAY 4/2/20   Iliana Coppola MD   diclofenac (VOLTAREN) 75 MG EC tablet TAKE 1 TABLET TWICE A DAY 1/27/20   Iliana Coppola MD       REVIEW OFSYSTEMS    (2-9 systems for level 4, 10 or more for level 5)      Review of Systems   Respiratory: Negative for shortness of breath. Cardiovascular: Negative for chest pain. Gastrointestinal: Negative for abdominal pain. Musculoskeletal: Positive for back pain.        PHYSICAL EXAM   (up to 7 for level 4, 8 or more forlevel 5)      INITIAL VITALS: ED Triage Vitals   BP Temp Temp src Pulse Resp SpO2 Height Weight   -- -- -- -- -- -- -- --       Physical Exam  Constitutional:       Appearance: She is not toxic-appearing or diaphoretic. Comments: Thin appearing and ill appearing   Pulmonary:      Effort: Pulmonary effort is normal.   Abdominal:      General: There is no distension. Palpations: Abdomen is soft. Tenderness: There is no abdominal tenderness. There is no guarding. Skin:     General: Skin is warm. Neurological:      Mental Status: She is oriented to person, place, and time. DIFFERENTIAL  DIAGNOSIS     PLAN (LABS / IMAGING / EKG):  No orders of the defined types were placed in this encounter. MEDICATIONS ORDERED:  Orders Placed This Encounter   Medications    oxyCODONE (ROXICODONE) immediate release tablet 10 mg    oxyCODONE (ROXICODONE) 5 MG immediate release tablet     Sig: Take 2 tablets by mouth every 6 hours as needed for Pain for up to 3 days. Intended supply: 3 days. Take lowest dose possible to manage pain     Dispense:  12 tablet     Refill:  0           Initial MDM/Plan: 69 y.o. female who presents with acute on chronic back pain after fall. Patient was tachycardic and mildly hypoxic on arrival.  Patient is DNR comfort care. Patient with decisional making capacity at this time. Family members present during discussion. The patient confirmed that she is only comfort care and here for her back pain. She is requesting pain medication as she is been set up with hospice for her to have pain meds. We will give the patient Roxicodone and discussed with social work hospice care. DIAGNOSTIC RESULTS / EMERGENCYDEPARTMENT COURSE / MDM     LABS:  Labs Reviewed - No data to display      RADIOLOGY:  No results found.       EKG      All EKG's are interpreted by the Emergency Department Physicianwho either signs or Co-signs this chart in the absence of a cardiologist.    [address] [place] [address]:      Critical access hospital

## 2021-05-23 ASSESSMENT — ENCOUNTER SYMPTOMS
BACK PAIN: 1
ABDOMINAL PAIN: 0
SHORTNESS OF BREATH: 0

## 2024-10-15 NOTE — TELEPHONE ENCOUNTER
- Observed signs of rheumatoid arthritis in the patient's hands.  - Evaluated the patient's hand condition.  - Acknowledged the progression of the condition.  - Recommend continued Humira treatment and referral to a rheumatologist for specialized care.   Please refer to GI